# Patient Record
Sex: MALE | Race: WHITE | NOT HISPANIC OR LATINO | Employment: OTHER | URBAN - METROPOLITAN AREA
[De-identification: names, ages, dates, MRNs, and addresses within clinical notes are randomized per-mention and may not be internally consistent; named-entity substitution may affect disease eponyms.]

---

## 2017-11-09 ENCOUNTER — HOSPITAL ENCOUNTER (INPATIENT)
Facility: HOSPITAL | Age: 82
LOS: 5 days | Discharge: HOME HEALTH | End: 2017-11-14
Attending: INTERNAL MEDICINE | Admitting: INTERNAL MEDICINE

## 2017-11-09 DIAGNOSIS — I61.0 NONTRAUMATIC SUBCORTICAL HEMORRHAGE OF LEFT CEREBRAL HEMISPHERE (HCC): ICD-10-CM

## 2017-11-09 DIAGNOSIS — Z74.09 IMPAIRED MOBILITY AND ADLS: ICD-10-CM

## 2017-11-09 DIAGNOSIS — Z74.09 IMPAIRED FUNCTIONAL MOBILITY, BALANCE, GAIT, AND ENDURANCE: Primary | ICD-10-CM

## 2017-11-09 DIAGNOSIS — Z78.9 IMPAIRED MOBILITY AND ADLS: ICD-10-CM

## 2017-11-09 DIAGNOSIS — R41.841 COGNITIVE COMMUNICATION DISORDER: ICD-10-CM

## 2017-11-09 PROBLEM — I61.9 ICH (INTRACEREBRAL HEMORRHAGE) (HCC): Status: ACTIVE | Noted: 2017-11-09

## 2017-11-09 PROBLEM — N40.0 BPH (BENIGN PROSTATIC HYPERPLASIA): Status: ACTIVE | Noted: 2017-11-09

## 2017-11-09 PROBLEM — I25.2 HISTORY OF MI (MYOCARDIAL INFARCTION): Status: ACTIVE | Noted: 2017-11-09

## 2017-11-09 PROBLEM — I10 HYPERTENSION: Status: ACTIVE | Noted: 2017-11-09

## 2017-11-09 LAB
GLUCOSE BLDC GLUCOMTR-MCNC: 120 MG/DL (ref 70–130)
GLUCOSE BLDC GLUCOMTR-MCNC: 123 MG/DL (ref 70–130)

## 2017-11-09 PROCEDURE — 99291 CRITICAL CARE FIRST HOUR: CPT | Performed by: INTERNAL MEDICINE

## 2017-11-09 PROCEDURE — 82962 GLUCOSE BLOOD TEST: CPT

## 2017-11-09 PROCEDURE — 99222 1ST HOSP IP/OBS MODERATE 55: CPT | Performed by: NURSE PRACTITIONER

## 2017-11-09 RX ORDER — PHENYLEPHRINE HCL IN 0.9% NACL 0.5 MG/5ML
.5-3 SYRINGE (ML) INTRAVENOUS
Status: DISCONTINUED | OUTPATIENT
Start: 2017-11-09 | End: 2017-11-11

## 2017-11-09 RX ORDER — ANTIOX #8/OM3/DHA/EPA/LUT/ZEAX 250-2.5 MG
1 CAPSULE ORAL 2 TIMES DAILY
COMMUNITY

## 2017-11-09 RX ORDER — TAMSULOSIN HYDROCHLORIDE 0.4 MG/1
1 CAPSULE ORAL 2 TIMES DAILY
COMMUNITY

## 2017-11-09 RX ORDER — NAPROXEN SODIUM 220 MG
220 TABLET ORAL 2 TIMES DAILY
COMMUNITY
End: 2017-11-14 | Stop reason: HOSPADM

## 2017-11-09 RX ORDER — SODIUM CHLORIDE 0.9 % (FLUSH) 0.9 %
1-10 SYRINGE (ML) INJECTION AS NEEDED
Status: DISCONTINUED | OUTPATIENT
Start: 2017-11-09 | End: 2017-11-10

## 2017-11-09 RX ORDER — ATENOLOL 25 MG/1
25 TABLET ORAL DAILY
COMMUNITY
End: 2017-12-13

## 2017-11-09 RX ORDER — ALLOPURINOL 300 MG/1
300 TABLET ORAL DAILY
COMMUNITY

## 2017-11-09 NOTE — NURSING NOTE
"Stroke Navigator CODE 19    HPI    Vel James is a 83 y.o.  male who was in his normal state of health until approximately 2 am.  At this time he states that he got out of bed to use the restroom and noticed that he had incoordination of all limbs and that his balance was off.  He also noted bilateral lower extremity weakness and sustained a fall.  He denies hitting his head.  After laying back down and wakening around 0800, Mr. James states that he \"just didn't feel right\".  He proceeded to go about his morning, including going to the gas station to get gas for his .  At this time he noticed that his vision was worse than usual (he has macular degeneration) and had pain over the left eye.  This prompted him to seek evaluation at [facility].  A CT head was completed there which revealed a left temporal parenchymal hemorrhage.  He was transferred to our facility for further evaluation and treatment.    Neurological Assessment    The patient is alert, oriented, and follows all commands.  Pupils are round and equal.  Right visual field cut is present (patient states this is his baseline).  No blurry or double vision is present.  Slight facial asymmetry is present.  Speech is clear, mild expressive aphasia/loss of fluency is noted during conversation.  He moves all extremities bilaterally.  LLE weakness is present.  Sensation is intact.  Patient complains of a mild headache.      NIHSS    Interval: baseline  1a. Level Of Consciousness: 0-->Alert: keenly responsive  1b. LOC Questions: 0-->Answers both questions correctly  1c. LOC Commands: 0-->Performs both tasks correctly  2. Best Gaze: 0-->Normal  3. Visual: 1-->Partial hemianopia (baseline - patient has macular degeneration)  4. Facial Palsy: 1-->Minor paralysis (flattened nasolabial fold, asymmetry on smiling)  5a. Motor Arm, Left: 0-->No drift: limb holds 90 (or 45) degrees for full 10 secs  5b. Motor Arm, Right: 0-->No drift: limb holds 90 (or " 45) degrees for full 10 secs  6a. Motor Leg, Left: 1-->Drift: leg falls by the end of the 5-sec period but does not hit bed  6b. Motor Leg, Right: 0-->No drift: leg holds 30 degree position for full 5 secs  7. Limb Ataxia: 0-->Absent  8. Sensory: 0-->Normal: no sensory loss  9. Best Language: 1-->Mild-to-moderate aphasia: some obvious loss of fluency or facility of comprehension, without significant limitation on ideas expressed or form of expression. Reduction of speech and/or comprehension, however, makes conversation. . . (see row details)  10. Dysarthria: 0-->Normal  11. Extinction and Inattention (formerly Neglect): 0-->No abnormality    Total (NIH Stroke Scale): 4    PLAN    CT scan was reviewed with Dr. Regalado.  ICH score is 1.  Hemorrhagic stroke order set was initiated per protocol. The patient is not a candidate for surgical intervention at this time.  He will need to be closely monitored in the neurological ICU overnight with strict BP control (SBP <140) and a follow-up head CT in the morning.  Formal neurosurgical consult to follow.  Plan of care discussed with the patient and primary RN.     Lynn Sweeney, RN EXTENDER/STROKE NAVIGATOR

## 2017-11-09 NOTE — H&P
Intensive Care Admission Note     ICH (intracerebral hemorrhage)    History of Present Illness     This is a very nice 83-year-old gentleman with a past medical history significant for benign prostatic hypertrophy, hypertension treated with atenolol, and a previous distant myocardial infarction treated with balloon angioplasty who was in his usual state of health today when he was retrieving some gasoline for his lawnmower when he developed some incoordination, headache, and visual field cuts. He was brought to [facility] where upon he was found to have a small left occipital lobe intracerebral hemorrhage. Blood pressure results of the been able to review showed that he is had a maximum of approximately 200 mmHg systolic. We are consulted for transfer to our facility for further stroke care. He arrives on a Cardene drip and does complain of visual field cuts. He also has severe macular degeneration but states these cuts are different than his previous vision problems. He has stated that over the past several years he has noted some visual changes that were intermittent that he was concerned could possibly be transient ischemic attacks. He otherwise has not had any other complaints of neurologic changes either in the past or currently. He denies any problems with recent fevers, chills, night sweats. He does not have any chest pain, shortness of breath, or palpitations. He has not had any recent medication changes. He is not on aspirin or Plavix.    Problem List, Surgical History, Family, Social History, and ROS     Past Medical History:   Diagnosis Date   • Asthma, extrinsic    • BPH (benign prostatic hyperplasia)    • Coronary artery disease    • Hypertension    • Macular degeneration syndrome        Past Surgical History:   Procedure Laterality Date   • CARDIAC CATHETERIZATION       Medications have been reviewed. The patient takes tamsulosin and atenolol. He does not take any blood thinners or  "anticoagulants.    Family History   Problem Relation Age of Onset   • Stroke Maternal Grandfather      Social History   Substance Use Topics   • Smoking status: Never Smoker   • Smokeless tobacco: Not on file   • Alcohol use No       Review of Systems  A full review of systems was completed. They are negative such as mentioned especially in the HPI.    Physical Exam and Clinical Information   /61  Pulse 81  Ht 67\" (170.2 cm)  Wt 224 lb 13.9 oz (102 kg)  SpO2 97%  BMI 35.22 kg/m2  Physical Exam   Constitutional: He is oriented to person, place, and time. He appears well-developed and well-nourished. No distress.   HENT:   Head: Normocephalic and atraumatic.   Mouth/Throat: Oropharynx is clear and moist. No oropharyngeal exudate.   Eyes: Conjunctivae and EOM are normal. Pupils are equal, round, and reactive to light. Right eye exhibits no discharge. Left eye exhibits no discharge. No scleral icterus.   Neck: Normal range of motion. Neck supple. No JVD present. No tracheal deviation present. No thyromegaly present.   No carotid bruits   Cardiovascular: Normal rate, regular rhythm and normal heart sounds.  Exam reveals no gallop and no friction rub.    No murmur heard.  Pulmonary/Chest: Effort normal and breath sounds normal. No stridor. No respiratory distress. He has no wheezes. He has no rales. He exhibits no tenderness.   Abdominal: Soft. Bowel sounds are normal. He exhibits no distension. There is no tenderness. There is no rebound and no guarding.   Musculoskeletal: Normal range of motion. He exhibits no edema, tenderness or deformity.   Lymphadenopathy:     He has no cervical adenopathy.   Neurological: He is alert and oriented to person, place, and time.   Skin: Skin is warm. He is not diaphoretic. No erythema.   Psychiatric: He has a normal mood and affect. His behavior is normal. Thought content normal.         Outside hospital labs have been reviewed and show a normal metabolic panel as well as " CBC. Coag factors are normal.    Images: Outside hospital films have been reviewed. These show partially a 3 cm left occipital hemorrhagic stroke.    I reviewed the patient's results and images.     Impression     Hospital Problem List     * (Principal)ICH (intracerebral hemorrhage), left occipital    Hypertension    History of MI (myocardial infarction)    BPH (benign prostatic hyperplasia)        Plan/Recommendations     We will admit the patient to the intensive care unit for close neurological monitoring per our protocols.  The hemorrhagic stroke order set has been ordered including tight blood pressure control for systolic blood pressure less than 140 mmHg systolic.  Hemoglobin A1c as well as follow-up labs a been ordered for both today as well as tomorrow morning.  When he is more stable, we will restart some of his home medications which amount to atenolol and Flomax.  The patient is critical but currently stable secondary to his hemorrhagic stroke.      Rafiq Queen MD, [facility]  Pulmonary and Critical Care Medicine  11/09/17 2:38 PM     Critical Care Time: 42 min (Not including time performing procedures)    CC: Kishan Ceballos MD

## 2017-11-09 NOTE — CONSULTS
"NAME: CARLYLE LERMA  : 1934  PCP: Kishan Ceballos MD  Attending MD: Yunier García, *    Date of Admission:  2017  Date of Service: 2017    History of Present Illness:  83 y.o.  male who states he got up at 0200 this morning to go to the bathroom and he had dis coordination.  He also fell at that time but denies any loss of consciousness or hitting his head.  He laid back down and then at 0800 he got back up and \"just didn't feel right\"  He then went to the gas station to get gas for the .  He noticed that his vision was worse and hazy in his right eye and he felt disoriented.  He has macular degeneration in the left and loss of vision.  He made it home and his wife took him to [facility] where a CT of the head revealed a left temporal parenchymal hemorrhage.  He was transferred to our facility for a higher level of care.     ROS: a 14 point ROS was reviewed and all was negative except for the positives noted below:  Review of Systems - General ROS: positive for  - dizziness, confusion, falls  Ophthalmic ROS: positive for - blurry vision, decreased vision, loss of vision and photophobia  Neurological ROS: positive for - confusion, dizziness, gait disturbance, headaches, impaired coordination/balance and visual changes    Past Medical History:  Past Medical History:   Diagnosis Date   • Asthma, extrinsic    • BPH (benign prostatic hyperplasia)    • Coronary artery disease    • Hypertension    • Macular degeneration syndrome        Past Surgical History:  Past Surgical History:   Procedure Laterality Date   • CARDIAC CATHETERIZATION           Medications  Prescriptions Prior to Admission   Medication Sig Dispense Refill Last Dose   • allopurinol (ZYLOPRIM) 300 MG tablet Take 300 mg by mouth Daily.      • atenolol (TENORMIN) 25 MG tablet Take 25 mg by mouth Daily.      • tamsulosin (FLOMAX) 0.4 MG capsule 24 hr capsule Take 1 capsule by mouth 2 (Two) Times a Day.    "       Allergies:  No Known Allergies    Social Hx:  Social History     Social History   • Marital status:      Spouse name: N/A   • Number of children: N/A   • Years of education: N/A     Occupational History   • Not on file.     Social History Main Topics   • Smoking status: Never Smoker   • Smokeless tobacco: Not on file   • Alcohol use No   • Drug use: No   • Sexual activity: Not on file     Other Topics Concern   • Not on file     Social History Narrative   • No narrative on file       Family Hx:  Family History   Problem Relation Age of Onset   • Stroke Maternal Grandfather        Review of Imaging:  CT scan was reviewed from [facility] which revealed a small left temporal lobe intraparenchymal hemorrhage.     I have reviewed the images myself.       Laboratory Result:  No results found for: WBC, HGB, HCT, MCV, PLT  No results found for: GLUCOSE, CALCIUM, NA, K, CO2, CL, BUN, CREATININE, EGFRIFAFRI, EGFRIFNONA, BCR, ANIONGAP  No results found for: HGBA1C  No results found for: CHOL  No results found for: TRIG  No results found for: HDL  No results found for: LDLCALC  No results found for: LDL  No results found for: HDLLDLRATIO  No components found for: CHOLHDL    Physical Examination:  Vitals:    11/09/17 1515   BP: 134/77   Pulse: 81   SpO2: 95%        General Appearance:   Well developed, well nourished, well groomed, alert, and cooperative.  Cardiovascular: Regular rate and rhythm. No carotid bruits    Neurological examination: Alert and oriented, no facial droop noted, no drift on his upper or lower extremities.  He has no sensation loss.  He is fluent of speech at the present time.  He does have visual field loss on the left from macular degeneration, however on the right this is new from the [facility].        Diagnoses/Plan:    Mr. James is a 83 y.o. male who will need to be monitored in the ICU for the next 24-48 hours.  He will need blood pressure control of less than 140.  He is not currently on any  antiplatelet therapy.  We will continue to observe and obtain a repeat head CT tomorrow to evaluate.  I have discussed the plan with the family and patient and they verbalize understanding.

## 2017-11-09 NOTE — NURSING NOTE
"  ACC REVIEW REPORT: [facility]        PATIENT NAME: Vel James    PATIENT ID: [id number]    BED: N232    BED TYPE: ICU    BED GIVEN TO:  CASPER SHIN RN     TIME BED GIVEN: 1252    YOB: 1934    AGE: 83 YRS    GENDER: MALE    PREVIOUS ADMIT TO [facility]:     PREVIOUS ADMISSION DATE:     PATIENT CLASS:     TODAY'S DATE: 11/9/2017    TRANSFER DATE:   11/09/17    ETA:     TRANSFERRING FACILITY: [facility]    TRANSFERRING FACILITY PHONE # : [phone]    TRANSFERRING MD:     DATE/TIME REQUEST RECEIVED:  11/09/17 @ 1224    [facility] RN:  TAMARA CENTENO     REPORT FROM: CASPER SHIN RN     TIME REPORT TAKEN: 1252    DIAGNOSIS: LEFT OCCIPITAL BLEED, MEASURES 15 X 30 MM    REASON FOR TRANSFER TO [facility]: HIGHER LEVEL CARE    TRANSPORTATION: GROUND    CLINICAL REASON FOR TRANSFER TO [facility]:  HE WOKE UP AT 2 AM TO GO TO THE .  HE FELT HIS LEFT LEG TRYING TO \"GO OUT UNDER HIM.\"  HE WENT BACK TO BED AND AWAKENED THIS AM WITH BLINDNESS IN HIS RIGHT EYE.  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  HIS ONLY CURRENT DEFICIT IS THE VISION IN HIS RIGHT EYE.  HE IS COMPLETELY ALERT AND ORIENTED.  HE SAID HE JUST WENT TO HIS DOCTOR YESTERDAY.  CT OF HIS HEAD SHOWS A LEFT OCCIPITAL BLEED THAT MEASURES 15 X 30 MM.  HE HAS RECEIVED LABETALOL IV FOR HIS INITIAL B/P /85 AND IS ON A CARDIZEM GTT @ 5 MG.  B/P IS /78, 72, 16, 100% ON RMA.         CLINICAL INFORMATION    HEIGHT:     WEIGHT: 227 LBS    ALLERGIES: NKDA    RITCHIE: NO    INFECTIOUS DISEASE:     ISOLATION:     LAST VITAL SIGNS:   TIME: 1245  TEMP: AFEBRILE  PULSE: 72  B/P: 169/83  RESP: 16      LAB INFORMATION: BUN-22, CL-109   REST OF HIS BMP IS WNL AND THAT IS ALL THEY HAVE BACK.      CULTURE INFORMATION:     MEDS/IV FLUIDS:  (2) IV'S   # 18 G LFA WITH CARDIZEM GOING AT 5 MG AND HE HAS A # 18 G IN HIS RAC.  HE HAS RECEIVED LABETALOL FOR HIS INITIAL B/P /85.         CARDIAC SYSTEM:    CHEST PAIN:     RATE:     SCALE: "     RHYTHM:     Is patient taking or has patient been given any drugs that could increase bleeding? NO  (Plavix, Brilinta, Effient, Eliquis, Xarelto, Warfarin, Integrilin, Angiomax)    DRUG:      DOSE/FREQUENCY:     CARDIAC ENZYMES:      CARDIAC NOTES:       RESPIRATORY SYSTEM:    LUNG SOUNDS:    CLEAR: YES  CRACKLES:   WHEEZES:   RHONCHI:   DIMINISHED:     ABG DATE:         ABG TIME:     ABG RESULTS:    PH:   PO2:   PCO2:   HCO3:   O2 SAT:       OXYGEN:     O2 SAT:  100% ON RMA    ADMINISTRATION ROUTE:     IMAGING FINDINGS:     PNEUMO LOCATION:     PNEUMO SIZE:     PNEUMO CHEST TUBE SEAL TYPE:     RADIOLOGY RESULTS:     RESPIRATORY STATUS:       CNS/MUSCULOSKELETAL    ALERT AND ORIENTED:    PERSON: YES  PLACE: YES  TIME: YES    INJURY:  WHERE: NO    JCARLOS COMA SCALE:    :STROKE SCALE:  THEY WEREN'T ABLE TO DO A STROKE SCALE BECAUSE HE IS BLIND IN THE LEFT EYE.  (FROM MACULAR DEGENERATION)  HE AWAKENED THIS AM WITH BLINDNESS IN THE RIGHT EYE AND THAT IS A NEW SYMPTOM.     SIZE OF HEMORRHAGE:  15 X 3 MM    SYMPTOMS: (CHOOSE APPROPRIATE)    ASPHASIA:   ATAXIA:   DYSARTHRIA:   DYSPHASIA:   HEADACHE:   PARALYSIS:   SEIZURE:   SYNCOPE:   VERTIGO:   VISION CHANGE:        EXTREMITY WEAKNESS:    LEFT ARM:   RIGHT ARM:   LEFT LEG:   RIGHT LEG:     CAT SCAN RESULTS:     MRI RESULTS:     CNS/MUSCULOSKELETAL NOTES:       GI//GY WNL      ABDOMINAL PAIN:     VOMITING:     DIARRHEA:     NAUSEA:     BOWEL SOUNDS:     OCCULT STOOL:     VAGINAL BLEEDING:     TESTICULAR PAIN:     HEMATURIA:     NG TUBE:    SIZE:   DATE INSERTED:       ULTRASOUND:     ULTRASOUND RESULTS:       ACUTE ABDOMEN:     ACUTE ABDOMEN RESULTS:       CT SCAN:     CT SCAN RESULTS:       GI//GY NOTES:     PAST MEDICAL HISTORY:  HTN., ARTHRITIS, LEFT EYE BLINDNESS FROM MACULAR DEGENERATION.    OTHER SYMPTOM NOTES:     ADDITIONAL NOTES:           Chayo Leyva RN  11/9/2017  12:43 PM

## 2017-11-10 ENCOUNTER — APPOINTMENT (OUTPATIENT)
Dept: CT IMAGING | Facility: HOSPITAL | Age: 82
End: 2017-11-10

## 2017-11-10 ENCOUNTER — APPOINTMENT (OUTPATIENT)
Dept: CT IMAGING | Facility: HOSPITAL | Age: 82
End: 2017-11-10
Attending: NEUROLOGICAL SURGERY

## 2017-11-10 LAB
ANION GAP SERPL CALCULATED.3IONS-SCNC: 3 MMOL/L (ref 3–11)
ARTICHOKE IGE QN: 44 MG/DL (ref 0–130)
BASOPHILS # BLD AUTO: 0.02 10*3/MM3 (ref 0–0.2)
BASOPHILS NFR BLD AUTO: 0.2 % (ref 0–1)
BUN BLD-MCNC: 18 MG/DL (ref 9–23)
BUN/CREAT SERPL: 20 (ref 7–25)
CA-I SERPL ISE-MCNC: 1.31 MMOL/L (ref 1.12–1.32)
CALCIUM SPEC-SCNC: 8.9 MG/DL (ref 8.7–10.4)
CHLORIDE SERPL-SCNC: 109 MMOL/L (ref 99–109)
CHOLEST SERPL-MCNC: 80 MG/DL (ref 0–200)
CO2 SERPL-SCNC: 27 MMOL/L (ref 20–31)
CREAT BLD-MCNC: 0.9 MG/DL (ref 0.6–1.3)
DEPRECATED RDW RBC AUTO: 79.8 FL (ref 37–54)
EOSINOPHIL # BLD AUTO: 0.12 10*3/MM3 (ref 0–0.3)
EOSINOPHIL NFR BLD AUTO: 1.4 % (ref 0–3)
ERYTHROCYTE [DISTWIDTH] IN BLOOD BY AUTOMATED COUNT: 22.8 % (ref 11.3–14.5)
GFR SERPL CREATININE-BSD FRML MDRD: 81 ML/MIN/1.73
GLUCOSE BLD-MCNC: 86 MG/DL (ref 70–100)
HBA1C MFR BLD: 5.4 % (ref 4.8–5.6)
HCT VFR BLD AUTO: 35.9 % (ref 38.9–50.9)
HDLC SERPL-MCNC: 24 MG/DL (ref 40–60)
HGB BLD-MCNC: 12.1 G/DL (ref 13.1–17.5)
IMM GRANULOCYTES # BLD: 0.01 10*3/MM3 (ref 0–0.03)
IMM GRANULOCYTES NFR BLD: 0.1 % (ref 0–0.6)
LYMPHOCYTES # BLD AUTO: 1.69 10*3/MM3 (ref 0.6–4.8)
LYMPHOCYTES NFR BLD AUTO: 20.1 % (ref 24–44)
MCH RBC QN AUTO: 32.8 PG (ref 27–31)
MCHC RBC AUTO-ENTMCNC: 33.7 G/DL (ref 32–36)
MCV RBC AUTO: 97.3 FL (ref 80–99)
MONOCYTES # BLD AUTO: 1.28 10*3/MM3 (ref 0–1)
MONOCYTES NFR BLD AUTO: 15.2 % (ref 0–12)
NEUTROPHILS # BLD AUTO: 5.3 10*3/MM3 (ref 1.5–8.3)
NEUTROPHILS NFR BLD AUTO: 63 % (ref 41–71)
PLATELET # BLD AUTO: 204 10*3/MM3 (ref 150–450)
PMV BLD AUTO: 10.1 FL (ref 6–12)
POTASSIUM BLD-SCNC: 4 MMOL/L (ref 3.5–5.5)
RBC # BLD AUTO: 3.69 10*6/MM3 (ref 4.2–5.76)
SODIUM BLD-SCNC: 139 MMOL/L (ref 132–146)
TRIGL SERPL-MCNC: 88 MG/DL (ref 0–150)
WBC NRBC COR # BLD: 8.42 10*3/MM3 (ref 3.5–10.8)

## 2017-11-10 PROCEDURE — 99233 SBSQ HOSP IP/OBS HIGH 50: CPT | Performed by: INTERNAL MEDICINE

## 2017-11-10 PROCEDURE — 92523 SPEECH SOUND LANG COMPREHEN: CPT

## 2017-11-10 PROCEDURE — 83036 HEMOGLOBIN GLYCOSYLATED A1C: CPT | Performed by: NEUROLOGICAL SURGERY

## 2017-11-10 PROCEDURE — 97163 PT EVAL HIGH COMPLEX 45 MIN: CPT

## 2017-11-10 PROCEDURE — 82330 ASSAY OF CALCIUM: CPT | Performed by: INTERNAL MEDICINE

## 2017-11-10 PROCEDURE — 70450 CT HEAD/BRAIN W/O DYE: CPT

## 2017-11-10 PROCEDURE — 25010000002 ONDANSETRON PER 1 MG: Performed by: NURSE PRACTITIONER

## 2017-11-10 PROCEDURE — 97165 OT EVAL LOW COMPLEX 30 MIN: CPT

## 2017-11-10 PROCEDURE — 80048 BASIC METABOLIC PNL TOTAL CA: CPT | Performed by: INTERNAL MEDICINE

## 2017-11-10 PROCEDURE — 80061 LIPID PANEL: CPT | Performed by: NEUROLOGICAL SURGERY

## 2017-11-10 PROCEDURE — 85025 COMPLETE CBC W/AUTO DIFF WBC: CPT | Performed by: INTERNAL MEDICINE

## 2017-11-10 PROCEDURE — 99231 SBSQ HOSP IP/OBS SF/LOW 25: CPT | Performed by: NEUROLOGICAL SURGERY

## 2017-11-10 RX ORDER — MELATONIN
1000 DAILY
COMMUNITY

## 2017-11-10 RX ORDER — CEPHRADINE 500 MG
1 CAPSULE ORAL DAILY
COMMUNITY

## 2017-11-10 RX ORDER — ONDANSETRON 2 MG/ML
4 INJECTION INTRAMUSCULAR; INTRAVENOUS EVERY 6 HOURS PRN
Status: DISCONTINUED | OUTPATIENT
Start: 2017-11-10 | End: 2017-11-14 | Stop reason: HOSPADM

## 2017-11-10 RX ORDER — FAMOTIDINE 20 MG/1
20 TABLET, FILM COATED ORAL 2 TIMES DAILY
COMMUNITY
End: 2018-02-12

## 2017-11-10 RX ORDER — ACETAMINOPHEN 325 MG/1
650 TABLET ORAL EVERY 6 HOURS PRN
Status: DISCONTINUED | OUTPATIENT
Start: 2017-11-10 | End: 2017-11-14 | Stop reason: HOSPADM

## 2017-11-10 RX ORDER — SILDENAFIL CITRATE 20 MG/1
20 TABLET ORAL DAILY
COMMUNITY
End: 2018-02-12

## 2017-11-10 RX ORDER — ATENOLOL 25 MG/1
25 TABLET ORAL DAILY
Status: DISCONTINUED | OUTPATIENT
Start: 2017-11-10 | End: 2017-11-14 | Stop reason: HOSPADM

## 2017-11-10 RX ORDER — LANOLIN ALCOHOL/MO/W.PET/CERES
2000 CREAM (GRAM) TOPICAL DAILY
COMMUNITY

## 2017-11-10 RX ADMIN — ACETAMINOPHEN 650 MG: 325 TABLET ORAL at 17:23

## 2017-11-10 RX ADMIN — ONDANSETRON 4 MG: 2 INJECTION INTRAMUSCULAR; INTRAVENOUS at 23:19

## 2017-11-10 RX ADMIN — NICARDIPINE HYDROCHLORIDE 5 MG/HR: 2.5 INJECTION INTRAVENOUS at 17:20

## 2017-11-10 RX ADMIN — NICARDIPINE HYDROCHLORIDE 15 MG/HR: 2.5 INJECTION INTRAVENOUS at 15:35

## 2017-11-10 RX ADMIN — ATENOLOL 25 MG: 25 TABLET ORAL at 14:34

## 2017-11-10 RX ADMIN — NICARDIPINE HYDROCHLORIDE 5 MG/HR: 2.5 INJECTION INTRAVENOUS at 12:30

## 2017-11-10 RX ADMIN — ONDANSETRON 4 MG: 2 INJECTION INTRAMUSCULAR; INTRAVENOUS at 14:35

## 2017-11-10 NOTE — PROGRESS NOTES
"Adult Nutrition  Assessment/PES    Patient Name:  Vel James  YOB: 1934  MRN: [id number]  Admit Date:  11/9/2017    Assessment Date:  11/10/2017              Reason for Assessment       11/10/17 1756    Reason for Assessment    Reason For Assessment/Visit identified at risk by screening criteria    Time Spent (min) 30    Diagnosis Diagnosis    Cardiac CAD;MI;HTN    Neurological ICH    Pulmonary/Critical Care Asthma    Other diagnosis Altered vision,h/o magular degeneration        Patient Active Problem List   Diagnosis   • ICH (intracerebral hemorrhage), left occipital   • Hypertension   • History of MI (myocardial infarction)   • BPH (benign prostatic hyperplasia)             Anthropometrics       11/10/17 1759    Anthropometrics (Special Considerations)    Height Used for Calculations 1.702 m (5' 7\")    Weight Used for Calculations 102 kg (224 lb)    RD Calculated BMI (kg/m2) 35            Labs/Tests/Procedures/Meds       11/10/17 1800    Labs/Tests/Procedures/Meds    Labs/Tests Review Reviewed;BUN;K+;Creat       Results from last 7 days  Lab Units 11/10/17  0413   SODIUM mmol/L 139   POTASSIUM mmol/L 4.0   CHLORIDE mmol/L 109   CO2 mmol/L 27.0   BUN mg/dL 18   CREATININE mg/dL 0.90   CALCIUM mg/dL 8.9   GLUCOSE mg/dL 86              Physical Findings       11/10/17 1800    Physical Findings/Assessment    Additional Documentation --   pt seems agitated, c/o difficulty seeing, is attempting to eat dinner, does not want anything sweet    per RN: pt has been complaining of headache, vomited 2x earlier today, very anxious, blurry vision, impulsive              Nutrition Prescription Ordered       11/10/17 1802    Nutrition Prescription PO    Current PO Diet Regular    Common Modifiers Cardiac            Evaluation of Received Nutrient/Fluid Intake       11/10/17 1802    PO Evaluation    Number of Days PO Intake Evaluated Insufficient Data    Number of Meals 2    % PO Intake 38    "         Problem/Interventions:        Problem 1       11/10/17 1756    Nutrition Diagnoses Problem 1    Problem 1 No Nutrition Diagnosis at this Time                    Intervention Goal       11/10/17 1802    Intervention Goal    General Nutrition support treatment    PO Establish PO            Nutrition Intervention       11/10/17 1802    Nutrition Intervention    RD/Tech Action Interview for preference;Menu provided;Menu adjusted;Follow Tx progress;Care plan reviewd              Education/Evaluation       11/10/17 1802    Monitor/Evaluation    Monitor Per protocol;PO intake;Pertinent labs;Weight;Skin status;Symptoms        Electronically signed by:  Mahogany Ashley RD  11/10/17 6:02 PM

## 2017-11-10 NOTE — PROGRESS NOTES
"Intensive Care Follow-up     Hospital:  LOS: 1 day   Mr. Vel James, 83 y.o. male is followed for:   ICH (intracerebral hemorrhage)        Subjective   Interval History:  The charts been reviewed. The patient has done relatively well overnight. It is reported to me that he has had some impulsiveness as well as some mild aphasia from time to time. He continues to complain of a visual field cut.    The patient's relevant past medical, surgical and social history were reviewed and updated in Epic as appropriate.        Objective     Infusions:    niCARdipine 5-15 mg/hr Last Rate: Stopped (11/09/17 2036)   phenylephrine 0.5-3 mcg/kg/min      Medications:       Vital Sign Min/Max for last 24 hours  Temp  Min: 97.6 °F (36.4 °C)  Max: 97.8 °F (36.6 °C)   BP  Min: 92/82  Max: 135/68   Pulse  Min: 63  Max: 90   Resp  Min: 14  Max: 18   SpO2  Min: 92 %  Max: 99 %   No Data Recorded       Input/Output for last 24 hour shift  11/09 0701 - 11/10 0700  In: 590.1 [P.O.:120; I.V.:470.1]  Out: 1250 [Urine:1250]      Objective:  General Appearance:  Comfortable, well-appearing and in no acute distress.    Vital signs: (most recent): Blood pressure 125/80, pulse 71, temperature 97.8 °F (36.6 °C), temperature source Oral, resp. rate 16, height 67\" (170.2 cm), weight 224 lb 13.9 oz (102 kg), SpO2 94 %.  No fever.    Output: Producing urine.    HEENT: Normal HEENT exam.    Lungs:  Normal respiratory rate and normal effort.  He is not in respiratory distress.  Breath sounds clear to auscultation.  No stridor.  No wheezes, rales, rhonchi or decreased breath sounds.    Heart: Normal rate.  Regular rhythm.  S1 normal and S2 normal.  No murmur or friction rub.   Chest: Symmetric chest wall expansion.   Abdomen: Abdomen is soft and non-distended.  Bowel sounds are normal.   There is no abdominal tenderness.  There is no rebound tenderness.     Extremities: Normal range of motion.  There is no dependent edema.    Neurological: Patient is " alert and oriented to person, place and time.  Normal strength.    Pupils:  Pupils are equal, round, and reactive to light.  Pupils are equal.               Results from last 7 days  Lab Units 11/10/17  0413   WBC 10*3/mm3 8.42   HEMOGLOBIN g/dL 12.1*   PLATELETS 10*3/mm3 204       Results from last 7 days  Lab Units 11/10/17  0413   SODIUM mmol/L 139   POTASSIUM mmol/L 4.0   CO2 mmol/L 27.0   BUN mg/dL 18   CREATININE mg/dL 0.90   GLUCOSE mg/dL 86     Estimated Creatinine Clearance: 70.8 mL/min (by C-G formula based on Cr of 0.9).          I reviewed the patient's results and images.     Assessment/Plan   Impression      Principal Problem:    ICH (intracerebral hemorrhage), left occipital  Active Problems:    Hypertension    History of MI (myocardial infarction)    BPH (benign prostatic hyperplasia)       Plan        Continue with physical and occupational therapy.  Due to continued changes, I would prefer to keep him in the intensive care unit today.  Continue with close neurological checks.  Follow-up labs and orders have been placed.    Plan of care and goals reviewed with mulitdisciplinary team at daily rounds.   I discussed the patient's findings and my recommendations with patient and nursing staff         Rafiq Queen MD, [facility]  Pulmonary and Critical Care Medicine  11/10/17 11:10 AM

## 2017-11-10 NOTE — PROGRESS NOTES
Subjective: No events overnight    Objective:    Vitals:    11/10/17 0600   BP: 125/80   Pulse: 71   Resp:    Temp:    SpO2: 94%     Pulse  Av.7  Min: 63  Max: 90  Systolic (24hrs), Av , Min:92 , Max:135     Diastolic (24hrs), Av, Min:60, Max:100    Temp (24hrs), Av.7 °F (36.5 °C), Min:97.6 °F (36.4 °C), Max:97.8 °F (36.6 °C)      He is awake, conversant, and appropriate.  He is reporting that his homonomous hemianopsia is subjectively somewhat better today.    A repeat head CT was performed.  This demonstrates the expected evolution of the small, left temporal intraparenchymal hematoma without significant progression.    Lab Results   Component Value Date     11/10/2017       A/P:   Cleared to transfer out of the ICU from a neurosurgical perspective  No neurosurgical intervention planned at this point  Presumptive etiology for his hemorrhage is hypertensive versus amyloid  Follow-up in stroke clinic in 30 days upon discharge  Anticipate some home health/PT requirements for his new visual field defects.

## 2017-11-10 NOTE — PLAN OF CARE
Problem: Patient Care Overview (Adult)  Goal: Plan of Care Review  Outcome: Ongoing (interventions implemented as appropriate)    11/10/17 1202   Coping/Psychosocial Response Interventions   Plan Of Care Reviewed With patient   Patient Care Overview   Progress progress toward functional goals as expected   Outcome Evaluation   Outcome Summary/Follow up Plan pt. presents with unstable characteristics to include increase in headache, visual changes, and increase in blood pressure. PT warrented to improve safety and independence with mobility and progress towards ambulation as medically appropriate. During eval, patient demonstrated sudden onset of pain,vision and vital sign changes therefore further mobility deferred and RN notified.         Problem: Stroke (Hemorrhagic) (Adult)  Goal: Signs and Symptoms of Listed Potential Problems Will be Absent or Manageable (Stroke)  Outcome: Ongoing (interventions implemented as appropriate)    11/10/17 1202   Stroke (Hemorrhagic)   Problems Assessed (Stroke (Hemorrhagic)) motor/sensory impairment   Problems Present (Stroke (Hemorrhagic)) motor/sensory impairment         Problem: Inpatient Physical Therapy  Goal: Bed Mobility Goal LTG- PT  Outcome: Ongoing (interventions implemented as appropriate)    11/10/17 1202   Bed Mobility PT LTG   Bed Mobility PT LTG, Date Established 11/10/17   Bed Mobility PT LTG, Time to Achieve 2 wks   Bed Mobility PT LTG, Activity Type supine to sit/sit to supine   Bed Mobility PT LTG, [place] Level independent   Bed Mobility PT Goal LTG, Assist Device bed rails       Goal: Transfer Training Goal 1 LTG- PT  Outcome: Ongoing (interventions implemented as appropriate)    11/10/17 1202   Transfer Training PT LTG   Transfer Training PT LTG, Date Established 11/10/17   Transfer Training PT LTG, Time to Achieve 2 wks   Transfer Training PT LTG, Activity Type bed to chair /chair to bed   Transfer Training PT LTG, [place] Level supervision required    Transfer Training PT LTG, Assist Device walker, rolling       Goal: Gait Training Goal LTG- PT  Outcome: Ongoing (interventions implemented as appropriate)    11/10/17 1202   Gait Training PT LTG   Gait Training Goal PT LTG, Date Established 11/10/17   Gait Training Goal PT LTG, Time to Achieve 2 wks   Gait Training Goal PT LTG, [place] Level supervision required   Gait Training Goal PT LTG, Assist Device walker, rolling   Gait Training Goal PT LTG, Distance to Achieve 300'

## 2017-11-10 NOTE — THERAPY EVALUATION
Acute Care - Speech Language Pathology Initial Evaluation  [facility]   Cognitive-Communication Evaluation     Patient Name: Vel James  : 1934  MRN: [id number]  Today's Date: 11/10/2017  Onset of Illness/Injury or Date of Surgery Date: 17            Admit Date: 2017     Visit Dx:    ICD-10-CM ICD-9-CM   1. Impaired functional mobility, balance, gait, and endurance Z74.09 V49.89   2. Impaired mobility and ADLs Z74.09 799.89   3. 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 315.32     Patient Active Problem List   Diagnosis   • ICH (intracerebral hemorrhage), left occipital   • Hypertension   • History of MI (myocardial infarction)   • BPH (benign prostatic hyperplasia)     Past Medical History:   Diagnosis Date   • Asthma, extrinsic    • BPH (benign prostatic hyperplasia)    • Coronary artery disease    • Hypertension    • Macular degeneration syndrome      Past Surgical History:   Procedure Laterality Date   • CARDIAC CATHETERIZATION            SLP EVALUATION (last 72 hours)      SLP Evaluation       11/10/17 1400                Rehab Evaluation    Document Type evaluation  -AC        Subjective Information complains of;pain;fatigue  -AC        General Information    Patient Profile Review yes  -AC        Onset of Illness/Injury 17  -AC        Subjective Patient Observations Pt alert, c/o headache, fatigue, frustration. Pt's wife present.   -AC        Pertinent History Of Current Problem Pt adm w/ L ICH. PMH: HTN, asthma, macular degeneration. Passed RN CVA dysphagia screen.  -AC        Current Diet Limitations regular solid;thin liquids  -AC        Precautions/Limitations, Vision other (see comments)   hx macular degeneration per chart  -AC        Precautions/Limitations, Hearing WFL;other (see comments)   for purposes of eval  -AC        Prior Level of Function- Communication functional in all spheres  -AC        Prior Level of Function- Swallowing no diet consistency restrictions  -AC         Plans/Goals Discussed With patient;spouse/S.O.;agreed upon  -AC        Barriers to Rehab none identified  -AC        Living Environment    Lives With spouse  -AC        Living Environment Comment Pt former . Lives w/ wife.   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 Pt very frustrated by deficits. W/ frustration & headache, eval was limited. Pt was able to state basic wants/needs w/o difficulty. Provided edu to pt/his wife re: cognitive-linguistic deficits & compensatory strategies. Further dx tx needed when pt able to participate.   -AC        Functional Level At Time Of Evaluation impaired  -AC        Patient's Goals For Discharge patient did not state  -AC        Family Goals For Discharge family did not state  -AC        Criteria for Skilled Therapeutic Interventions Met skilled criteria for cognitive linguistic intervention met  -AC        Rehab Potential good, to achieve stated therapy goals  -AC        Therapy Frequency 5 times/wk  -AC        Pain Assessment    Pain Assessment 0-10  -AC        Pain Score 8  -AC        Post Pain Score 8  -AC        Pain Type Acute pain  -AC        Pain Location Head  -AC        Pain Intervention(s) Rest   RN present & aware  -AC        Response to Interventions tolerated  -AC        Cognitive Assessment/Intervention    Current Cognitive/Communication Assessment impaired  -AC        Orientation Status oriented to;person;place;disoriented to;time;situation  -AC        Short/Long Term Memory unable/difficult to assess  -AC        Additional Documentation Cognitive Assessment Intervention (Group)  -AC        Cognitive Assessment Intervention    Behavior/Mood Observations alert  -AC        Attention unable/difficult to assess  -AC        Problem Solving unable/difficult to assess  -AC        Reasoning unable/difficult to assess  -AC        Organization unable/difficult to assess   "-AC        Communication Assessment/Intervention    Additional Documentation Auditory Comprehen Assess/Intervention (Group);Motor Speech Assessment/Intervention (Group);Verbal Expression Assess/Intervention (Group)  -AC        Auditory Comprehen Assess/Intervention    Auditory Comprehension other (see comments)   further assessment needed when pt able to cooperate  -AC        Auditory Comprehen Assess/Intervention    Answers Yes/No Questions other (see comments)   general & personal y/n ?s: 70% acc  -AC        Able to Follow Commands other (see comments)   pt did not cooperate to follow commands: \"I dont' want to.\"  -AC        Verbal Expression Assess/Intervention    Automatic Speech successful, spontaneous greeting  -AC        Conversational Speech Comment Word-finding difficulty noted @ times in conversational speech. Pt/his wife reported pt has having difficulty recalling the names of his children. Pt did not cooperate to complete structured verbal expression tasks 2' headache/frustration. Further assessment needed.  -AC        Motor Speech Assess/Intervention    Motor Speech-Apraxia Observations no concerns  -AC        Motor Speech-Dysarthria Observations no concerns  -AC        Motor Speech- Dysarthria Comment Pt 100% intelligible to unfamiliar listener. No obvious motor speech deficits noted in conversational speech.  -AC        Communication Treatment Objective and Progress    Expressive Treatment Objectives Improve word retrieval skills  -AC        Improve word retrieval skills    Improve word retrieval skills by: completing functional word finding tasks;80%;with inconsistent cues  -AC        Status: Improve word retrieval skills New  -        Word Retrieval Skills Progress continue to address  -          User Key  (r) = Recorded By, (t) = Taken By, (c) = Cosigned By    Initials Name Effective Dates    AC Katt Serrano MS CCC-SLP 07/27/17 -            EDUCATION  The patient has been educated in the " following areas:   Cognitive Impairment Communication Impairment.    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 Pt very frustrated by deficits. W/ frustration & headache, eval was limited. Pt was able to state basic wants/needs w/o difficulty. Provided edu to pt/his wife re: cognitive-linguistic deficits & compensatory strategies. Further dx tx needed when pt able to participate.   Rehab Potential: good, to achieve stated therapy goals  Criteria for Skilled Therapeutic Interventions Met: skilled criteria for cognitive linguistic intervention met  Therapy Frequency: 5 times/wk    Plan of Care Review  Plan Of Care Reviewed With: patient, spouse  Progress:  (eval)  Outcome Summary/Follow up Plan: Cognitive-communication eval completed per CVA Pathway. 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 Pt very frustrated by deficits. W/ frustration & headache, eval was limited. Pt was able to state basic wants/needs w/o difficulty. Provided edu to pt/his wife re: cognitive-linguistic deficits & compensatory strategies. Further dx tx needed when pt able to participate. SLP will f/u.          IP SLP Goals       11/10/17 1541          Expressive- Optimal Participation in Care    Expressive Optimal Participation in Care- SLP, Date Established 11/10/17  -      Expressive Optimal Participation in Care- SLP, Time to Achieve by discharge  -        User Key  (r) = Recorded By, (t) = Taken By, (c) = Cosigned By    Initials Name Provider Type    TINY Serrano MS CCC-SLP Speech and Language Pathologist        Time Calculation:         Time Calculation- SLP       11/10/17 1542          Time Calculation- SLP    SLP Start Time 1400  -      SLP Received On 11/10/17  -        User Key  (r) = Recorded By, (t) = Taken By, (c) = Cosigned By    Initials Name Provider Type    TINY Serrano MS CCC-SLP Speech and  Language Pathologist          Therapy Charges for Today     Code Description Service Date Service Provider Modifiers Qty    [id number]  ST EVAL SPEECH AND PROD W LANG  2 11/10/2017 Katt Serrano, MS CCC-SLP GN 1                     Katt Serrano, MS EMILIO-SLP  11/10/2017

## 2017-11-10 NOTE — PLAN OF CARE
Problem: Patient Care Overview (Adult)  Goal: Plan of Care Review  Outcome: Ongoing (interventions implemented as appropriate)    11/10/17 1414   Coping/Psychosocial Response Interventions   Plan Of Care Reviewed With patient   Patient Care Overview   Progress progress toward functional goals is gradual   Outcome Evaluation   Outcome Summary/Follow up Plan OT completed a brief chart review relating to presenting physical deficits. Pt session limited d/t sudden onset HA, visual changes, and significant increase in BP. Recommend pt DC to IP rehab based on current level of performance, however will monitor progress closely. Recommend cont skilled IPOT intervention.          Problem: Inpatient Occupational Therapy  Goal: Transfer Training Goal 1 LTG- OT  Outcome: Ongoing (interventions implemented as appropriate)    11/10/17 1414   Transfer Training OT LTG   Transfer Training OT LTG, Date Established 11/10/17   Transfer Training OT LTG, Time to Achieve by discharge   Transfer Training OT LTG, Activity Type toilet;sit to stand/stand to sit   Transfer Training OT LTG, [place] Level contact guard assist   Transfer Training OT LTG, Additional Goal AAD       Goal: Patient Education Goal LTG- OT  Outcome: Ongoing (interventions implemented as appropriate)    11/10/17 1414   Patient Education OT LTG   Patient Education OT LTG, Date Established 11/10/17   Patient Education OT LTG, Time to Achieve by discharge   Patient Education OT LTG, Education Type written program;HEP;positioning;posture/body mechanics;home safety;adaptive equipment mgmt;energy conservation   Patient Education OT LTG, Education Understanding verbalizes understanding       Goal: LB Dressing Goal LTG- OT  Outcome: Ongoing (interventions implemented as appropriate)    11/10/17 1414   LB Dressing OT LTG   LB Dressing Goal OT LTG, Date Established 11/10/17   LB Dressing Goal OT LTG, Time to Achieve by discharge   LB Dressing Goal OT LTG, Activity Type  Don/doff socks   LB Dressing Goal OT LTG, [place] Level supervision required   LB Dressing Goal OT LTG, Additional Goal AAD       Goal: Activity Tolerance Goal LTG- OT  Outcome: Ongoing (interventions implemented as appropriate)    11/10/17 1414   Activity Tolerance OT LTG   Activity Tolerance Goal OT LTG, Date Established 11/10/17   Activity Tolerance Goal OT LTG, Time to Achieve by discharge   Activity Tolerance Goal OT LTG, Activity Level 10 min activity   Activity Tolerance Goal OT LTG, Additional Goal 1 seated rest break, VSS

## 2017-11-10 NOTE — THERAPY EVALUATION
Acute Care - Occupational Therapy Initial Evaluation  [facility]     Patient Name: Vel James  : 1934  MRN: [id number]  Today's Date: 11/10/2017  Onset of Illness/Injury or Date of Surgery Date: 17  Date of Referral to OT: 17  Referring Physician: MD Fabricio    Admit Date: 2017       ICD-10-CM ICD-9-CM   1. Impaired functional mobility, balance, gait, and endurance Z74.09 V49.89   2. Impaired mobility and ADLs Z74.09 799.89     Patient Active Problem List   Diagnosis   • ICH (intracerebral hemorrhage), left occipital   • Hypertension   • History of MI (myocardial infarction)   • BPH (benign prostatic hyperplasia)     Past Medical History:   Diagnosis Date   • Asthma, extrinsic    • BPH (benign prostatic hyperplasia)    • Coronary artery disease    • Hypertension    • Macular degeneration syndrome      Past Surgical History:   Procedure Laterality Date   • CARDIAC CATHETERIZATION            OT ASSESSMENT FLOWSHEET (last 72 hours)      OT Evaluation       11/10/17 1412 11/10/17 1406 11/10/17 1036 11/10/17 1020 17    Rehab Evaluation    Document Type   evaluation  -MJ evaluation  -CL     Subjective Information   agree to therapy;complains of;pain   headache  -MJ agree to therapy;complains of;pain  -CL     Patient Effort, Rehab Treatment   good  -MJ good  -CL     Patient Effort, Rehab Treatment Comment   limited by increased headache, dizziness and vision changes  -MJ      Symptoms Noted During/After Treatment   increased pain;significant change in vital signs  -MJ increased pain;significant change in vital signs  -CL     Symptoms Noted Comment   noted acute onset of increase in BP, increase in headache and vision changes.  RN notified immediately who was present remainder of session.    -MJ Prior to any activity, pt c/o sudden onset dizziness and increased HA, BP checked and noted significant change. RN notified immediately and was present for remainder of session. Pt w/ noted  increased expressive aphasia, disoriented to place, and decreased vision in R eye. RN and charge nurse present and aware. RN requested to have pt remain reclined in chair. Deferred further tx.  -CL     General Information    Patient Profile Review   yes  -MJ yes  -CL     Onset of Illness/Injury or Date of Surgery Date   11/09/17  -MJ 11/09/17  -CL     Referring Physician   MD Fabricio  -LIS Regalado MD  -CL     Pertinent History Of Current Problem   admitting w/ headache, worsening vision, incoordination. Per CT scan, L occipital lobe intracranial hemorrhage  - Pt prseented to [facility] ED 2/2 to developing a HA, incoordination, and visual field cuts. Pt found to have a small L occipital lobe ICH. Pt t/f to [facility] for HLOC.   -CL     Precautions/Limitations   fall precautions  - fall precautions;other (see comments)   B visual impairment, macular degeneration  -CL     Prior Level of Function   independent:;all household mobility;community mobility;ADL's  - independent:;all household mobility;transfer;ADL's;dressing;bathing;driving  -     Equipment Currently Used at Home cane, straight  -  cane, straight  - cane, straight  -CL     Plans/Goals Discussed With   patient  -MJ patient;agreed upon  -CL     Risks Reviewed   patient:;LOB;dizziness  - patient:;LOB;nausea/vomiting;dizziness;increased discomfort;change in vital signs;lines disloged  -CL     Benefits Reviewed   patient:;improve function;increase strength;increase balance;increase independence  - patient:;improve function;increase independence;decrease pain;increase balance;increase strength;increase knowledge  -     Barriers to Rehab   none identified  - none identified  -CL     Living Environment    Lives With spouse  -  spouse  -MJ spouse  -CL     Living Arrangements house   Lives in 1 level home in [facility] his wife.  2 steps to get into front door.  -  house  - house  -CL     Home Accessibility no concerns;bed and bath on same level;stairs  to enter home  -HH  no concerns;bed and bath on same level;stairs to enter home  -MJ stairs to enter home;bed and bath on same level   walk-in shower  -CL     Number of Stairs to Enter Home 2  -HH  1  -MJ 1  -CL     Stair Railings at Home none  -HH  none  -MJ      Type of Financial/Environmental Concern none  -HH        Transportation Available car;family or friend will provide  -HH        Clinical Impression    Date of Referral to OT    11/09/17  -CL     OT Diagnosis    Decreased independence in ADLs.   -CL     Patient/Family Goals Statement    Return to [facility].   -CL     Criteria for Skilled Therapeutic Interventions Met    yes;treatment indicated  -CL     Rehab Potential    good, to achieve stated therapy goals  -CL     Therapy Frequency    daily  -CL     Anticipated Equipment Needs At Discharge    --   TBA further  -CL     Anticipated Discharge Disposition    inpatient rehabilitation facility  -CL     Functional Level Prior    Ambulation  1-->assistive equipment  -HH   1-->assistive equipment  -AS    Transferring  0-->independent  -HH   0-->independent  -AS    Toileting  0-->independent  -HH   0-->independent  -AS    Bathing  0-->independent  -HH   0-->independent  -AS    Dressing  0-->independent  -HH   0-->independent  -AS    Eating  0-->independent  -HH   0-->independent  -AS    Communication  0-->understands/communicates without difficulty  -HH   0-->understands/communicates without difficulty  -AS    Swallowing  0-->swallows foods/liquids without difficulty  -HH   0-->swallows foods/liquids without difficulty  -AS    Prior Functional Level Comment     independent  -AS    Vital Signs    Pre Systolic BP Rehab   129  -  -CL     Pre Treatment Diastolic BP   75  -MJ 75  -CL     Intra Systolic BP Rehab   151  -  -CL     Intra Treatment Diastolic BP   108  -  -CL     Post Systolic BP Rehab   156  -  -CL     Post Treatment Diastolic BP   98  -MJ 98  -CL     Pretreatment Heart Rate (beats/min)     77  -CL     Posttreatment Heart Rate (beats/min)    72  -CL     Pre SpO2 (%)   98  -MJ 98  -CL     O2 Delivery Pre Treatment   room air  -MJ room air  -CL     Post SpO2 (%)   98  -MJ 98  -CL     O2 Delivery Post Treatment   room air  -MJ room air  -CL     Pain Assessment    Pain Assessment   0-10  -MJ 0-10  -CL     Pain Score   4  -MJ 4  -CL     Post Pain Score   8  -MJ 8  -CL     Pain Type   Acute pain  -MJ Acute pain  -CL     Pain Location   Head  -MJ Head  -CL     Pain Orientation   Anterior   L forehead  -MJ Anterior  -CL     Pain Descriptors   Headache  -MJ      Pain Onset   Sudden   change while seated in chair prior to mobility  -MJ      Pain Intervention(s)   Repositioned  -MJ Repositioned  -CL     Response to Interventions   RN notified immediately  -MJ RN notified immediately.   -CL     Vision Assessment/Intervention    Visual Impairment Comment   pt. reports change in vision from start to end of evaluation.  RN notified and assessed  -MJ Pt w/ L sided visual deficits at baseline. Pt reported change in R sided vision during session, RN aware.   -CL     Visual Tracking Comment    Pt tracks appropriately R-L, though decreased to the L (pt reports is baseline).  -CL     Cognitive Assessment/Intervention    Current Cognitive/Communication Assessment   functional   progressively became confused as evaluation progressed  -MJ functional  -CL     Orientation Status   oriented x 4  -MJ oriented x 4   Pt intially oriented x4, then progressed to only person  -CL     Follows Commands/Answers Questions   able to follow single-step instructions;75% of the time  -MJ 75% of the time;needs cueing;needs increased time;needs repetition  -CL     Personal Safety   decreased awareness, need for assist;decreased awareness, need for safety  -MJ moderate impairment;decreased awareness, need for assist;decreased awareness, need for safety;impulsive  -CL     Personal Safety Interventions   fall prevention program maintained;gait  belt;nonskid shoes/slippers when out of bed  -MJ fall prevention program maintained;gait belt;nonskid shoes/slippers when out of bed  -CL     ROM (Range of Motion)    General ROM   no range of motion deficits identified  -MJ no range of motion deficits identified  -CL     MMT (Manual Muscle Testing)    General MMT Assessment   no strength deficits identified  -MJ      General MMT Assessment Detail   4/5 throughout BLE  -MJ BUE grossly 4-/5.   -CL     Bed Mobility, Assessment/Treatment    Bed Mobility, Comment    UIC.   -CL     Transfer Assessment/Treatment    Transfers, Sit-Stand [place]   contact guard assist  -MJ contact guard assist;verbal cues required  -CL     Transfers, Stand-Sit [place]   minimum assist (75% patient effort)  -MJ minimum assist (75% patient effort);verbal cues required  -CL     Transfer, Safety Issues   impulsivity  -MJ      Transfer, Comment    Pt stood impulively, VCs to return to sitting.   -CL     Functional Mobility    Functional Mobility- Comment    Deferred.   -CL     Motor Skills/Interventions    Additional Documentation   Balance Skills Training (Group)  -MJ Balance Skills Training (Group)  -CL     Balance Skills Training    Sitting-Level of Assistance   Independent  -MJ Close supervision   d/t impulsivity  -CL     Sitting-Balance Support   Feet supported  -MJ Feet supported  -CL     Standing-Level of Assistance   Contact guard  -MJ      Static Standing Balance Support   No upper extremity supported   Contact gaurd assist  -MJ      Sensory Assessment/Intervention    Light Touch   RLE;LLE   baseline peripheral neuropathy  -MJ LUE;RUE  -CL     LUE Light Touch    WNL  -CL     RUE Light Touch    WNL  -CL     LLE Light Touch   mild impairment  -MJ      RLE Light Touch   mild impairment  -MJ      Positioning and Restraints    Pre-Treatment Position   sitting in chair/recliner  -MJ sitting in chair/recliner  -CL     Post Treatment Position   chair  -MJ chair  -CL     In Chair    with nsg;reclined;exit alarm on;encouraged to call for assist;call light within reach;legs elevated   RN requests patient to remain in chair  -MJ notified nsg;reclined;call light within reach;encouraged to call for assist;exit alarm on;with nsg;legs elevated;with other staff   RN requested pt to remain in chair  -CL       11/09/17 1600 11/09/17 1500             General Information    Equipment Currently Used at Home  cane, straight  -LC       Living Environment    Lives With spouse  -LC        Living Arrangements house  -        Home Accessibility no concerns  -LC        Stair Railings at Home none  -LC        Type of Financial/Environmental Concern none  -LC        Transportation Available car  -        Functional Level Prior    Ambulation 1-->assistive equipment  -LC        Transferring 0-->independent  -LC        Toileting 0-->independent  -LC        Bathing 0-->independent  -LC        Dressing 0-->independent  -LC        Eating 0-->independent  -LC        Communication 0-->understands/communicates without difficulty  -LC        Swallowing 0-->swallows foods/liquids without difficulty  -LC        Prior Functional Level Comment 0  -LC          User Key  (r) = Recorded By, (t) = Taken By, (c) = Cosigned By    Initials Name Effective Dates     Naveen Govea RN 03/14/16 -     LC Tati Bang RN 06/16/16 -     AS Celi Price, RN 06/16/16 -     CL Bernice Mann OT 06/08/16 -     LIS Galvan, PT 10/30/17 -            Occupational Therapy Education     Title: PT OT SLP Therapies (Active)     Topic: Occupational Therapy (Active)     Point: ADL training (Active)    Description: Instruct learner(s) on proper safety adaptation and remediation techniques during self care or transfers.   Instruct in proper use of assistive devices.    Learning Progress Summary    Learner Readiness Method Response Comment Documented by Status   Patient Acceptance E,D NR Pt educated on appropriate safety precautions,  t/f techniques, and role of therapy.  11/10/17 1413 Active               Point: Precautions (Active)    Description: Instruct learner(s) on prescribed precautions during self-care and functional transfers.    Learning Progress Summary    Learner Readiness Method Response Comment Documented by Status   Patient Acceptance E,D NR Pt educated on appropriate safety precautions, t/f techniques, and role of therapy.  11/10/17 1413 Active               Point: Body mechanics (Active)    Description: Instruct learner(s) on proper positioning and spine alignment during self-care, functional mobility activities and/or exercises.    Learning Progress Summary    Learner Readiness Method Response Comment Documented by Status   Patient Acceptance E,D NR Pt educated on appropriate safety precautions, t/f techniques, and role of therapy.  11/10/17 1413 Active                      User Key     Initials Effective Dates Name Provider Type Discipline     06/08/16 -  Bernice Mann OT Occupational Therapist OT                  OT Recommendation and Plan  Anticipated Equipment Needs At Discharge:  (TBA further)  Anticipated Discharge Disposition: inpatient rehabilitation facility  Therapy Frequency: daily  Plan of Care Review  Plan Of Care Reviewed With: patient  Progress: progress toward functional goals is gradual  Outcome Summary/Follow up Plan: OT completed a brief chart review relating to presenting physical deficits. Pt session limited d/t sudden onset HA, visual changes, and significant increase in BP. Recommend pt DC to IP rehab based on current level of performance, however will monitor progress closely. Recommend cont skilled IPOT intervention.           OT Goals       11/10/17 1414          Transfer Training OT LTG    Transfer Training OT LTG, Date Established 11/10/17  -CL      Transfer Training OT LTG, Time to Achieve by discharge  -CL      Transfer Training OT LTG, Activity Type toilet;sit to stand/stand to sit  -CL       Transfer Training OT LTG, [place] Level contact guard assist  -CL      Transfer Training OT LTG, Additional Goal AAD  -CL      Patient Education OT LTG    Patient Education OT LTG, Date Established 11/10/17  -CL      Patient Education OT LTG, Time to Achieve by discharge  -CL      Patient Education OT LTG, Education Type written program;HEP;positioning;posture/body mechanics;home safety;adaptive equipment mgmt;energy conservation  -CL      Patient Education OT LTG, Education Understanding verbalizes understanding  -CL      LB Dressing OT LTG    LB Dressing Goal OT LTG, Date Established 11/10/17  -CL      LB Dressing Goal OT LTG, Time to Achieve by discharge  -CL      LB Dressing Goal OT LTG, Activity Type Don/doff socks  -CL      LB Dressing Goal OT LTG, [place] Level supervision required  -CL      LB Dressing Goal OT LTG, Additional Goal AAD  -CL      Activity Tolerance OT LTG    Activity Tolerance Goal OT LTG, Date Established 11/10/17  -CL      Activity Tolerance Goal OT LTG, Time to Achieve by discharge  -CL      Activity Tolerance Goal OT LTG, Activity Level 10 min activity  -CL      Activity Tolerance Goal OT LTG, Additional Goal 1 seated rest break, VSS  -CL        User Key  (r) = Recorded By, (t) = Taken By, (c) = Cosigned By    Initials Name Provider Type    CL Bernice Mann OT Occupational Therapist                Outcome Measures       11/10/17 1036 11/10/17 1020       How much help from another person do you currently need...    Turning from your back to your side while in flat bed without using bedrails? 3  -MJ      Moving from lying on back to sitting on the side of a flat bed without bedrails? 3  -MJ      Moving to and from a bed to a chair (including a wheelchair)? 3  -MJ      Standing up from a chair using your arms (e.g., wheelchair, bedside chair)? 3  -MJ      Climbing 3-5 steps with a railing? 3  -MJ      To walk in hospital room? 3  -MJ      AM-PAC 6 Clicks Score 18  -MJ      How  much help from another is currently needed...    Putting on and taking off regular lower body clothing?  2  -CL     Bathing (including washing, rinsing, and drying)  2  -CL     Toileting (which includes using toilet bed pan or urinal)  2  -CL     Putting on and taking off regular upper body clothing  3  -CL     Taking care of personal grooming (such as brushing teeth)  3  -CL     Eating meals  3  -CL     Score  15  -CL     Modified [place] Scale    Modified [place] Scale 3 - Moderate disability.  Requiring some help, but able to walk without assistance.  -MJ 3 - Moderate disability.  Requiring some help, but able to walk without assistance.  -CL     Functional Assessment    Outcome Measure Options AM-PAC 6 Clicks Basic Mobility (PT);Modified [place]  -MJ AM-PAC 6 Clicks Daily Activity (OT)  -CL       User Key  (r) = Recorded By, (t) = Taken By, (c) = Cosigned By    Initials Name Provider Type    CL Bernice Mann OT Occupational Therapist    LIS Galvan, PT Physical Therapist          Time Calculation:   OT Start Time: 1020    Therapy Charges for Today     Code Description Service Date Service Provider Modifiers Qty    [id number]  OT EVAL LOW COMPLEXITY 4 11/10/2017 Bernice Mann OT GO 1               Bernice Mann OT  11/10/2017

## 2017-11-10 NOTE — PROGRESS NOTES
Discharge Planning Assessment  [facility]     Patient Name: Vel James  MRN: [id number]  Today's Date: 11/10/2017    Admit Date: 11/9/2017          Discharge Needs Assessment       11/10/17 1412    Living Environment    Lives With spouse    Living Arrangements house   Lives in 1 level home in [facility]. c his wife.  2 steps to get into front door.    Home Accessibility no concerns;bed and bath on same level;stairs to enter home    Number of Stairs to Enter Home 2    Stair Railings at Home none    Type of Financial/Environmental Concern none    Transportation Available car;family or friend will provide    Living Environment    Provides Primary Care For no one    Quality Of Family Relationships supportive    Able to Return to Prior Living Arrangements yes    Discharge Needs Assessment    Concerns To Be Addressed basic needs concerns;adjustment to diagnosis/illness concerns    Readmission Within The Last 30 Days no previous admission in last 30 days    Outpatient/Agency/Support Group Needs --   He has never needed a HH agency.    Equipment Currently Used at Home cane, straight            Discharge Plan       11/10/17 1416    Case Management/Social Work Plan    Plan Home    Patient/Family In Agreement With Plan yes    Additional Comments Spoke c Mr. James and his wife @ BS.  He lives c his wife in 1 level home in [facility].  He uses a straight cane at home, no other DME in home.  He has never used HH or [facility].  His PCP is Dr. Kishan Ceballos.  He uses [facility] on [address] in [place].  His goal is to return home c assist from his wife.  They have children, but [place] of [place].  CM will follow for upcoming d/c needs.  We discussed possible need for rehab.  They are in agreement to rehab if needed.          Discharge Placement     No information found                Demographic Summary     None            Functional Status       11/10/17 1406    Functional Status Current    Ambulation  1-->assistive equipment    Transferring 0-->independent    Toileting 0-->independent    Bathing 0-->independent    Dressing 2-->assistive person    Eating 0-->independent    Communication 0-->understands/communicates without difficulty    Swallowing (if score 2 or more for any item, consult Rehab Services) 0-->swallows foods/liquids without difficulty    Functional Status Prior    Ambulation 1-->assistive equipment    Transferring 0-->independent    Toileting 0-->independent    Bathing 0-->independent    Dressing 0-->independent    Eating 0-->independent    Communication 0-->understands/communicates without difficulty    Swallowing 0-->swallows foods/liquids without difficulty    IADL    Medications independent    Meal Preparation independent    Housekeeping independent    Laundry independent    Shopping independent    Oral Care independent            Psychosocial     None            Abuse/Neglect     None            Legal     None            Substance Abuse     None            Patient Forms     None          Naveen Govea RN

## 2017-11-10 NOTE — PLAN OF CARE
Problem: Patient Care Overview (Adult)  Goal: Plan of Care Review  Outcome: Ongoing (interventions implemented as appropriate)    11/10/17 1541   Coping/Psychosocial Response Interventions   Plan Of Care Reviewed With patient;spouse   Patient Care Overview   Progress (eval)   Outcome Evaluation   Outcome Summary/Follow up Plan Cognitive-communication eval completed per [facility]. 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 Pt very frustrated by deficits. W/ frustration & headache, eval was limited. Pt was able to state basic wants/needs w/o difficulty. Provided edu to pt/his wife re: cognitive-linguistic deficits & compensatory strategies. Further dx tx needed when pt able to participate. SLP will f/u.         Problem: Stroke (Hemorrhagic) (Adult)  Goal: Signs and Symptoms of Listed Potential Problems Will be Absent or Manageable (Stroke)  Outcome: Ongoing (interventions implemented as appropriate)    11/10/17 1541   Stroke (Hemorrhagic)   Problems Assessed (Stroke (Hemorrhagic)) communication impairment   Problems Present (Stroke (Hemorrhagic)) communication impairment         Problem: Inpatient SLP  Goal: Expressive-Patient will improve expressive language skills to allow optimal participation in care  Outcome: Ongoing (interventions implemented as appropriate)    11/10/17 1541   Expressive- Optimal Participation in Care   Expressive Optimal Participation in Care- SLP, Date Established 11/10/17   Expressive Optimal Participation in Care- SLP, Time to Achieve by discharge

## 2017-11-10 NOTE — PROGRESS NOTES
I was called by the nurse several hours ago for a transient episode of hypertension which was followed by some subjective problems with worsening expressive aphasia and some complaints of decreased vision.  I ordered a repeat head CT.    The repeat head CT has been reviewed, and there is no significant change in the small, left temporal intraparenchymal hemorrhage.    From my standpoint, the patient can still safely be transferred to the [facility].  Dispo planning can also be undertaken, and there is no plan for surgical intervention at this time.

## 2017-11-10 NOTE — THERAPY EVALUATION
Acute Care - Physical Therapy Initial Evaluation  [facility]     Patient Name: Vel James  : 1934  MRN: [id number]  Today's Date: 11/10/2017   Onset of Illness/Injury or Date of Surgery Date: 17  Date of Referral to PT: 17  Referring Physician: MD Fabricio      Admit Date: 2017     Visit Dx:    ICD-10-CM ICD-9-CM   1. Impaired functional mobility, balance, gait, and endurance Z74.09 V49.89     Patient Active Problem List   Diagnosis   • ICH (intracerebral hemorrhage), left occipital   • Hypertension   • History of MI (myocardial infarction)   • BPH (benign prostatic hyperplasia)     Past Medical History:   Diagnosis Date   • Asthma, extrinsic    • BPH (benign prostatic hyperplasia)    • Coronary artery disease    • Hypertension    • Macular degeneration syndrome      Past Surgical History:   Procedure Laterality Date   • CARDIAC CATHETERIZATION            PT ASSESSMENT (last 72 hours)      PT Evaluation       11/10/17 1036 17 1600    Rehab Evaluation    Document Type evaluation  -     Subjective Information agree to therapy;complains of;pain   headache  -     Patient Effort, Rehab Treatment good  -     Patient Effort, Rehab Treatment Comment limited by increased headache, dizziness and vision changes  -     Symptoms Noted During/After Treatment increased pain;significant change in vital signs  -     Symptoms Noted Comment noted acute onset of increase in BP, increase in headache and vision changes.  RN notified immediately who was present remainder of session.    -     General Information    Patient Profile Review yes  -MJ     Onset of Illness/Injury or Date of Surgery Date 17  -MJ     Referring Physician MD Fabricio  -MJ     Pertinent History Of Current Problem admitting w/ headache, worsening vision, incoordination. Per CT scan, L occipital lobe intracranial hemorrhage  -MJ     Precautions/Limitations fall precautions  -MJ     Prior Level of Function  independent:;all household mobility;community mobility;ADL's  -MJ     Equipment Currently Used at Home cane, straight  -MJ     Plans/Goals Discussed With patient  -MJ     Risks Reviewed patient:;LOB;dizziness  -MJ     Benefits Reviewed patient:;improve function;increase strength;increase balance;increase independence  -MJ     Barriers to Rehab none identified  -MJ     Living Environment    Lives With spouse  -MJ spouse  -LC    Living Arrangements house  -MJ house  -LC    Home Accessibility no concerns;bed and bath on same level;stairs to enter home  -MJ no concerns  -    Number of Stairs to Enter Home 1  -MJ     Stair Railings at Home none  -MJ none  -LC    Type of Financial/Environmental Concern  none  -LC    Transportation Available  car  -LC    Clinical Impression    Date of Referral to PT 11/09/17  -MJ     PT Diagnosis Decreased mobility, balance and endurance  -MJ     Patient/Family Goals Statement to return to [facility]  -     Criteria for Skilled Therapeutic Interventions Met yes;treatment indicated  -MJ     Rehab Potential good, to achieve stated therapy goals  -MJ     Vital Signs    Pre Systolic BP Rehab 129  -MJ     Pre Treatment Diastolic BP 75  -MJ     Intra Systolic BP Rehab 151  -MJ     Intra Treatment Diastolic   -MJ     Post Systolic BP Rehab 156  -MJ     Post Treatment Diastolic BP 98  -MJ     Pre SpO2 (%) 98  -MJ     O2 Delivery Pre Treatment room air  -MJ     Post SpO2 (%) 98  -MJ     O2 Delivery Post Treatment room air  -MJ     Pain Assessment    Pain Assessment 0-10  -MJ     Pain Score 4  -MJ     Post Pain Score 8  -MJ     Pain Type Acute pain  -MJ     Pain Location Head  -MJ     Pain Orientation Anterior   L forehead  -MJ     Pain Descriptors Headache  -MJ     Pain Onset Sudden   change while seated in chair prior to mobility  -MJ     Pain Intervention(s) Repositioned  -MJ     Response to Interventions RN notified immediately  -MJ     Vision Assessment/Intervention    Visual Impairment  Comment pt. reports change in vision from start to end of evaluation.  RN notified and assessed  -MJ     Cognitive Assessment/Intervention    Current Cognitive/Communication Assessment functional   progressively became confused as evaluation progressed  -MJ     Orientation Status oriented x 4  -MJ     Follows Commands/Answers Questions able to follow single-step instructions;75% of the time  -MJ     Personal Safety decreased awareness, need for assist;decreased awareness, need for safety  -MJ     Personal Safety Interventions fall prevention program maintained;gait belt;nonskid shoes/slippers when out of bed  -MJ     ROM (Range of Motion)    General ROM no range of motion deficits identified  -MJ     MMT (Manual Muscle Testing)    General MMT Assessment no strength deficits identified  -MJ     General MMT Assessment Detail 4/5 throughout BLE  -MJ     Transfer Assessment/Treatment    Transfers, Sit-Stand [place] contact guard assist  -MJ     Transfers, Stand-Sit [place] minimum assist (75% patient effort)  -MJ     Transfer, Safety Issues impulsivity  -MJ     Gait Assessment/Treatment    Gait, Lincoln Level not appropriate to assess   increase in BP and headache  -MJ     Motor Skills/Interventions    Additional Documentation Balance Skills Training (Group)  -MJ     Balance Skills Training    Sitting-Level of Assistance Independent  -MJ     Sitting-Balance Support Feet supported  -MJ     Standing-Level of Assistance Contact guard  -MJ     Static Standing Balance Support No upper extremity supported   Contact gaurd assist  -MJ     Sensory Assessment/Intervention    Light Touch RLE;LLE   baseline peripheral neuropathy  -MJ     LLE Light Touch mild impairment  -MJ     RLE Light Touch mild impairment  -MJ     Positioning and Restraints    Pre-Treatment Position sitting in chair/recliner  -MJ     Post Treatment Position chair  -MJ     In Chair with nsg;reclined;exit alarm on;encouraged to call for assist;call  light within reach;legs elevated   RN requests patient to remain in chair  -       11/09/17 1500       General Information    Equipment Currently Used at Home cane, straight  -LC       User Key  (r) = Recorded By, (t) = Taken By, (c) = Cosigned By    Initials Name Provider Type     Tati Bang, RN Registered Nurse     Mirela Galvan, PT Physical Therapist          Physical Therapy Education     Title: PT OT SLP Therapies (Active)     Topic: Physical Therapy (Active)     Point: Mobility training (Done)    Learning Progress Summary    Learner Readiness Method Response Comment Documented by Status   Patient Acceptance E VU,NR   11/10/17 1201 Done               Point: Body mechanics (Done)    Learning Progress Summary    Learner Readiness Method Response Comment Documented by Status   Patient Acceptance E VU,NR   11/10/17 1201 Done                      User Key     Initials Effective Dates Name Provider Type Discipline     10/30/17 -  Mirela Galvan, PT Physical Therapist PT                PT Recommendation and Plan  Anticipated Discharge Disposition: inpatient rehabilitation facility  PT Frequency: daily  Plan of Care Review  Plan Of Care Reviewed With: patient  Outcome Summary/Follow up Plan: pt. presents with unstable characteristics to include increase in headache, visual changes, and increase in blood pressure.  PT warrented to improve safety and independence with mobility and progress towards ambulation as medically appropriate.          IP PT Goals       11/10/17 1202          Bed Mobility PT LTG    Bed Mobility PT LTG, Date Established 11/10/17  -      Bed Mobility PT LTG, Time to Achieve 2 wks  -      Bed Mobility PT LTG, Activity Type supine to sit/sit to supine  -      Bed Mobility PT LTG, [place] Level independent  -      Bed Mobility PT Goal  LTG, Assist Device bed rails  -      Transfer Training PT LTG    Transfer Training PT LTG, Date Established 11/10/17  -       Transfer Training PT LTG, Time to Achieve 2 wks  -MJ      Transfer Training PT LTG, Activity Type bed to chair /chair to bed  -MJ      Transfer Training PT LTG, Travis Level supervision required  -MJ      Transfer Training PT LTG, Assist Device walker, rolling  -MJ      Gait Training PT LTG    Gait Training Goal PT LTG, Date Established 11/10/17  -MJ      Gait Training Goal PT LTG, Time to Achieve 2 wks  -MJ      Gait Training Goal PT LTG, [place] Level supervision required  -MJ      Gait Training Goal PT LTG, Assist Device walker, rolling  -MJ      Gait Training Goal PT LTG, Distance to Achieve 300'  -MJ        User Key  (r) = Recorded By, (t) = Taken By, (c) = Cosigned By    Initials Name Provider Type    LIS Galvan PT Physical Therapist                Outcome Measures       11/10/17 1036          How much help from another person do you currently need...    Turning from your back to your side while in flat bed without using bedrails? 3  -MJ      Moving from lying on back to sitting on the side of a flat bed without bedrails? 3  -MJ      Moving to and from a bed to a chair (including a wheelchair)? 3  -MJ      Standing up from a chair using your arms (e.g., wheelchair, bedside chair)? 3  -MJ      Climbing 3-5 steps with a railing? 3  -MJ      To walk in hospital room? 3  -MJ      AM-PAC 6 Clicks Score 18  -MJ      Modified Butts Scale    Modified Fide Scale 3 - Moderate disability.  Requiring some help, but able to walk without assistance.  -MJ      Functional Assessment    Outcome Measure Options AM-PAC 6 Clicks Basic Mobility (PT);Modified Fide  -MJ        User Key  (r) = Recorded By, (t) = Taken By, (c) = Cosigned By    Initials Name Provider Type    LIS Galvan, PT Physical Therapist           Time Calculation:         PT Charges       11/10/17 1219          Time Calculation    Start Time 1036  -MJ      PT Received On 11/10/17  -      PT Goal Re-Cert Due Date 11/20/17  -         User Key  (r) = Recorded By, (t) = Taken By, (c) = Cosigned By    Initials Name Provider Type     Mirela Galvan, PT Physical Therapist          Therapy Charges for Today     Code Description Service Date Service Provider Modifiers Qty    [id number]  PT EVAL HIGH COMPLEXITY 4 11/10/2017 Mirela Galvan, PT GP 1          PT G-Codes  Outcome Measure Options: AM-PAC 6 Clicks Basic Mobility (PT), Modified Fideradha Galvan, PT  11/10/2017

## 2017-11-10 NOTE — PLAN OF CARE
Problem: Patient Care Overview (Adult)  Goal: Plan of Care Review  Outcome: Ongoing (interventions implemented as appropriate)  Goal: Adult Individualization and Mutuality  Outcome: Ongoing (interventions implemented as appropriate)  Goal: Discharge Needs Assessment  Outcome: Ongoing (interventions implemented as appropriate)    Problem: Stroke (Hemorrhagic) (Adult)  Goal: Signs and Symptoms of Listed Potential Problems Will be Absent or Manageable (Stroke)  Outcome: Ongoing (interventions implemented as appropriate)    Problem: Fall Risk (Adult)  Goal: Identify Related Risk Factors and Signs and Symptoms  Outcome: Ongoing (interventions implemented as appropriate)  Goal: Absence of Falls  Outcome: Ongoing (interventions implemented as appropriate)

## 2017-11-11 LAB
ANION GAP SERPL CALCULATED.3IONS-SCNC: 6 MMOL/L (ref 3–11)
BASOPHILS # BLD AUTO: 0.02 10*3/MM3 (ref 0–0.2)
BASOPHILS NFR BLD AUTO: 0.2 % (ref 0–1)
BUN BLD-MCNC: 19 MG/DL (ref 9–23)
BUN/CREAT SERPL: 21.1 (ref 7–25)
CALCIUM SPEC-SCNC: 9.3 MG/DL (ref 8.7–10.4)
CHLORIDE SERPL-SCNC: 108 MMOL/L (ref 99–109)
CO2 SERPL-SCNC: 26 MMOL/L (ref 20–31)
CREAT BLD-MCNC: 0.9 MG/DL (ref 0.6–1.3)
DEPRECATED RDW RBC AUTO: 81.9 FL (ref 37–54)
EOSINOPHIL # BLD AUTO: 0.12 10*3/MM3 (ref 0–0.3)
EOSINOPHIL NFR BLD AUTO: 1 % (ref 0–3)
ERYTHROCYTE [DISTWIDTH] IN BLOOD BY AUTOMATED COUNT: 22.9 % (ref 11.3–14.5)
GFR SERPL CREATININE-BSD FRML MDRD: 81 ML/MIN/1.73
GLUCOSE BLD-MCNC: 100 MG/DL (ref 70–100)
HCT VFR BLD AUTO: 37.9 % (ref 38.9–50.9)
HGB BLD-MCNC: 12.4 G/DL (ref 13.1–17.5)
IMM GRANULOCYTES # BLD: 0.03 10*3/MM3 (ref 0–0.03)
IMM GRANULOCYTES NFR BLD: 0.3 % (ref 0–0.6)
LYMPHOCYTES # BLD AUTO: 1.78 10*3/MM3 (ref 0.6–4.8)
LYMPHOCYTES NFR BLD AUTO: 15.1 % (ref 24–44)
MCH RBC QN AUTO: 32 PG (ref 27–31)
MCHC RBC AUTO-ENTMCNC: 32.7 G/DL (ref 32–36)
MCV RBC AUTO: 97.9 FL (ref 80–99)
MONOCYTES # BLD AUTO: 1.69 10*3/MM3 (ref 0–1)
MONOCYTES NFR BLD AUTO: 14.4 % (ref 0–12)
NEUTROPHILS # BLD AUTO: 8.11 10*3/MM3 (ref 1.5–8.3)
NEUTROPHILS NFR BLD AUTO: 69 % (ref 41–71)
PLATELET # BLD AUTO: 210 10*3/MM3 (ref 150–450)
PMV BLD AUTO: 10.6 FL (ref 6–12)
POTASSIUM BLD-SCNC: 3.8 MMOL/L (ref 3.5–5.5)
RBC # BLD AUTO: 3.87 10*6/MM3 (ref 4.2–5.76)
SODIUM BLD-SCNC: 140 MMOL/L (ref 132–146)
WBC NRBC COR # BLD: 11.75 10*3/MM3 (ref 3.5–10.8)

## 2017-11-11 PROCEDURE — 94640 AIRWAY INHALATION TREATMENT: CPT

## 2017-11-11 PROCEDURE — 99232 SBSQ HOSP IP/OBS MODERATE 35: CPT | Performed by: INTERNAL MEDICINE

## 2017-11-11 PROCEDURE — 80048 BASIC METABOLIC PNL TOTAL CA: CPT | Performed by: INTERNAL MEDICINE

## 2017-11-11 PROCEDURE — 94799 UNLISTED PULMONARY SVC/PX: CPT

## 2017-11-11 PROCEDURE — 25010000002 ONDANSETRON PER 1 MG: Performed by: NURSE PRACTITIONER

## 2017-11-11 PROCEDURE — 25010000002 HYDRALAZINE PER 20 MG: Performed by: INTERNAL MEDICINE

## 2017-11-11 PROCEDURE — 85025 COMPLETE CBC W/AUTO DIFF WBC: CPT | Performed by: INTERNAL MEDICINE

## 2017-11-11 PROCEDURE — 92507 TX SP LANG VOICE COMM INDIV: CPT

## 2017-11-11 PROCEDURE — 94760 N-INVAS EAR/PLS OXIMETRY 1: CPT

## 2017-11-11 RX ORDER — PANTOPRAZOLE SODIUM 40 MG/1
40 TABLET, DELAYED RELEASE ORAL
Status: DISCONTINUED | OUTPATIENT
Start: 2017-11-12 | End: 2017-11-14 | Stop reason: HOSPADM

## 2017-11-11 RX ORDER — IPRATROPIUM BROMIDE AND ALBUTEROL SULFATE 2.5; .5 MG/3ML; MG/3ML
3 SOLUTION RESPIRATORY (INHALATION)
Status: DISCONTINUED | OUTPATIENT
Start: 2017-11-11 | End: 2017-11-14 | Stop reason: HOSPADM

## 2017-11-11 RX ORDER — HYDRALAZINE HYDROCHLORIDE 20 MG/ML
10 INJECTION INTRAMUSCULAR; INTRAVENOUS EVERY 6 HOURS PRN
Status: DISCONTINUED | OUTPATIENT
Start: 2017-11-11 | End: 2017-11-14 | Stop reason: HOSPADM

## 2017-11-11 RX ORDER — PANTOPRAZOLE SODIUM 40 MG/1
40 TABLET, DELAYED RELEASE ORAL ONCE
Status: COMPLETED | OUTPATIENT
Start: 2017-11-11 | End: 2017-11-11

## 2017-11-11 RX ADMIN — NICARDIPINE HYDROCHLORIDE 5 MG/HR: 2.5 INJECTION INTRAVENOUS at 11:12

## 2017-11-11 RX ADMIN — ONDANSETRON 4 MG: 2 INJECTION INTRAMUSCULAR; INTRAVENOUS at 17:33

## 2017-11-11 RX ADMIN — HYDRALAZINE HYDROCHLORIDE 10 MG: 20 INJECTION INTRAMUSCULAR; INTRAVENOUS at 15:54

## 2017-11-11 RX ADMIN — NICARDIPINE HYDROCHLORIDE 7.5 MG/HR: 2.5 INJECTION INTRAVENOUS at 06:11

## 2017-11-11 RX ADMIN — PANTOPRAZOLE SODIUM 40 MG: 40 TABLET, DELAYED RELEASE ORAL at 15:58

## 2017-11-11 RX ADMIN — IPRATROPIUM BROMIDE AND ALBUTEROL SULFATE 3 ML: .5; 3 SOLUTION RESPIRATORY (INHALATION) at 16:32

## 2017-11-11 RX ADMIN — IPRATROPIUM BROMIDE AND ALBUTEROL SULFATE 3 ML: .5; 3 SOLUTION RESPIRATORY (INHALATION) at 20:09

## 2017-11-11 RX ADMIN — ATENOLOL 25 MG: 25 TABLET ORAL at 08:18

## 2017-11-11 NOTE — THERAPY TREATMENT NOTE
Acute Care - Speech Language Pathology Treatment Note  [facility]     Patient Name: Vel James  : 1934  MRN: [id number]  Today's Date: 2017         Admit Date: 2017    Visit Dx:      ICD-10-CM ICD-9-CM   1. Impaired functional mobility, balance, gait, and endurance Z74.09 V49.89   2. Impaired mobility and ADLs Z74.09 799.89   3. 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 315.32     Patient Active Problem List   Diagnosis   • ICH (intracerebral hemorrhage), left occipital   • Hypertension   • History of MI (myocardial infarction)   • BPH (benign prostatic hyperplasia)              Adult Rehabilitation Note       17 1100          Rehab Assessment/Intervention    Discipline speech language pathologist  -LS      Document Type therapy note (daily note)  -LS      Recorded by [LS] Martine Perez MS CCC-SLP      Cognitive Assessment/Intervention    Additional Documentation Cognitive Assessment Intervention (Group)  -LS      Recorded by [LS] Martine Perez MS CCC-SLP      Cognitive Assessment Intervention    Behavior/Mood Observations alert;behavior appropriate to situation, WNL/WFL  -LS      Attention --  -LS      Attending Skill Interventions Fuctional for tasks fo his choosing. Difficulty to engage pt in further assessment tasks  -LS      Organization WNL/WFL  -LS      Recorded by [LS] Martine Perez MS CCC-SLP      Communication Assessment/Intervention    Additional Documentation Auditory Comprehen Assess/Intervention (Group);Verbal Expression Assess/Intervention (Group);Motor Speech Assessment/Intervention (Group)  -LS      Recorded by [LS] Martine Perez MS CCC-SLP      Auditory Comprehen Assess/Intervention    Auditory Comprehension Comment Suspect functional. Patient dominated conversation but answered appropriatly when SLP inquired on various topics.   -LS      Recorded by [LS] Martine Perez MS CCC-SLP      Improve word retrieval skills    Improve word retrieval skills by:  "completing functional word finding tasks;80%;with inconsistent cues  -LS      Status: Improve word retrieval skills Progressing as expected  -LS      Word Retrieval Skills Progress 60%;without cues  -LS      Comments: Improve word retrieval skills Difficulty with word finding noted in conversation but pt was often able to talk around word. Pt presented with difficulty in structured naming activites but appeared to be approving. It appeared he would avoid tasks he thought were challenging and stated \"he wanted to work it out himself\"  -LS      Recorded by [LS] Martine Perez MS CCC-SLP        User Key  (r) = Recorded By, (t) = Taken By, (c) = Cosigned By    Initials Name Effective Dates    LS Martine Perez MS CCC-SLP 06/22/15 -               IP SLP Goals       11/11/17 1210 11/10/17 1541       Expressive- Optimal Participation in Care    Expressive Optimal Participation in Care- SLP, Date Established 11/11/17  -LS 11/10/17  -AC     Expressive Optimal Participation in Care- SLP, Time to Achieve by discharge  - by discharge  -     Expressive Optimal Participation in Care- SLP, Date Goal Reviewed 11/11/17  -LS      Expressive Optimal Participation in Care- SLP, Outcome goal ongoing  -LS        User Key  (r) = Recorded By, (t) = Taken By, (c) = Cosigned By    Initials Name Provider Type    TINY Serrano MS CCC-SLP Speech and Language Pathologist    KAT Perez MS CCC-SLP Speech and Language Pathologist          EDUCATION  The patient has been educated in the following areas:   Cognitive Impairment Communication Impairment.    SLP Recommendation and Plan                               Plan of Care Review  Plan Of Care Reviewed With: patient  Outcome Summary/Follow up Plan: Communication tx complete. Pt is improving but continues to present with word-finding difficulties. Pt observed to have some task avoidance behavior as he was limited on topics he would discuss and change topic if task was " "challenging.  SLP provided education on benefits of ongoing  SLP services in OP however pt appeared resistant stating it needed time and  for him to  \"work it out himself.\" SLP will continue to follow for tx.          Time Calculation:         Time Calculation- SLP       11/11/17 1214          Time Calculation- SLP    SLP Start Time 1100  -LS      SLP Received On 11/11/17  -        User Key  (r) = Recorded By, (t) = Taken By, (c) = Cosigned By    Initials Name Provider Type     Martine Perez MS CCC-SLP Speech and Language Pathologist          Therapy Charges for Today     Code Description Service Date Service Provider Modifiers Qty    [id number]  ST TREATMENT SPEECH 3 11/11/2017 Martine Perez MS CCC-SLP GN 1               Martine Perez MS CCC-SLP  11/11/2017  "

## 2017-11-11 NOTE — PLAN OF CARE
"Problem: Patient Care Overview (Adult)  Goal: Plan of Care Review  Outcome: Ongoing (interventions implemented as appropriate)    11/11/17 1210   Coping/Psychosocial Response Interventions   Plan Of Care Reviewed With patient   Outcome Evaluation   Outcome Summary/Follow up Plan Communication tx complete. Pt is improving but continues to present with word-finding difficulties. Pt observed to have some task avoidance behavior as he was limited on topics he would discuss and change topic if task was challenging. SLP provided education on benefits of ongoing SLP services in OP however pt appeared resistant stating it needed time and for him to \"work it oui himself.\" SLP will continue to follow for tx.         Problem: Inpatient SLP  Goal: Expressive-Patient will improve expressive language skills to allow optimal participation in care  Outcome: Ongoing (interventions implemented as appropriate)    11/11/17 1210   Expressive- Optimal Participation in Care   Expressive Optimal Participation in Care- SLP, Date Established 11/11/17   Expressive Optimal Participation in Care- SLP, Time to Achieve by discharge   Expressive Optimal Participation in Care- SLP, Date Goal Reviewed 11/11/17   Expressive Optimal Participation in Care- SLP, Outcome goal ongoing           "

## 2017-11-11 NOTE — PROGRESS NOTES
"Critical Care Note     LOS: 2 days   Patient Care Team:  Kishan Ceballos MD as PCP - General (Family Medicine)    Chief Complaint/Reason for visit: Left temporal hemorrhage        Subjective     Interval History:     83-year-old gentleman hospitalized with a left temporal intraparenchymal hemorrhage. Yesterday he developed some transient worsening in his speech and decreased vision. Repeat CT scan was unchanged. This morning he does have a persistent headache. Nursing notes a productive cough. He also has some heartburn which is a new symptom for him. He is tolerating a by mouth diet without nausea or vomiting.     Review of Systems:    All systems were reviewed and negative except as noted in subjective.    Medical history, surgical history, social history, family history reviewed    Objective     Intake/Output:    Intake/Output Summary (Last 24 hours) at 11/11/17 1426  Last data filed at 11/11/17 1112   Gross per 24 hour   Intake              705 ml   Output             1100 ml   Net             -395 ml       Nutrition:  Diet Regular; Thin; Cardiac    Infusions:    niCARdipine 5-15 mg/hr Last Rate: 5 mg/hr (11/11/17 1112)   phenylephrine 0.5-3 mcg/kg/min          Telemetry:  Sinus Rhythm: normal sinus rhythm          Vital Signs  Blood pressure 122/84, pulse 73, temperature 98 °F (36.7 °C), temperature source Oral, resp. rate 17, height 67\" (170.2 cm), weight 224 lb 13.9 oz (102 kg), SpO2 94 %.    Physical Exam:  General Appearance:  Overweight elderly gentleman in no distress    Head:  Normocephalic, atraumatic    Eyes:          Pupils equal reactive to light, no jaundice    Ears:     Throat: Oral mucosa moist    Neck: Trachea midline, no palpable thyroid    Back:      Lungs:   Symmetric chest excursion with scattered expiratory rhonchi on forced expiration, no wheeze     Heart:  Regular, normal S1, S2, no murmur    Abdomen:   Bowel sounds active, nondistended, soft    Rectal:   Deferred   Extremities: No pitting " edema or cyanosis    Pulses: Pedal pulses present    Skin: Warm and dry    Lymph nodes:    Neurologic: Alert and oriented. Pupils equal reactive to light. Handgrip symmetric. Fine motor skills are clumsy, some mild difficulty with word finding       Results Review:     I reviewed the patient's new clinical results.     Results from last 7 days  Lab Units 11/11/17  0401 11/10/17  0413   SODIUM mmol/L 140 139   POTASSIUM mmol/L 3.8 4.0   CHLORIDE mmol/L 108 109   CO2 mmol/L 26.0 27.0   BUN mg/dL 19 18   CREATININE mg/dL 0.90 0.90   CALCIUM mg/dL 9.3 8.9   GLUCOSE mg/dL 100 86       Results from last 7 days  Lab Units 11/11/17  0401 11/10/17  0413   WBC 10*3/mm3 11.75* 8.42   HEMOGLOBIN g/dL 12.4* 12.1*   HEMATOCRIT % 37.9* 35.9*   PLATELETS 10*3/mm3 210 204         No results found for: BLOODCX  No results found for: URINECX    I reviewed the patient's new imaging including images and reports.      All medications reviewed.     atenolol 25 mg Oral Daily   ipratropium-albuterol 3 mL Nebulization 4x Daily - RT   pantoprazole 40 mg Oral Once   [START ON 11/12/2017] pantoprazole 40 mg Oral Q AM         Assessment/Plan     Principal Problem:    ICH (intracerebral hemorrhage), left occipital  Active Problems:    Hypertension    History of MI (myocardial infarction)    BPH (benign prostatic hyperplasia)          PLAN:  Neurochecks every shift  Up with assistance  OT/PT/speech  Start Protonix for heartburn  Start nebulized bronchodilators for productive cough  Continue atenolol for his hypertension  Telemetry    VTE Prophylaxis:SCDS    Stress Ulcer Prophylaxis:protonix    Aleja Rodriguez MD  11/11/17  2:26 PM      Time: 25min

## 2017-11-12 LAB
ALBUMIN SERPL-MCNC: 4 G/DL (ref 3.2–4.8)
ALBUMIN/GLOB SERPL: 2 G/DL (ref 1.5–2.5)
ALP SERPL-CCNC: 56 U/L (ref 25–100)
ALT SERPL W P-5'-P-CCNC: 14 U/L (ref 7–40)
ANION GAP SERPL CALCULATED.3IONS-SCNC: 6 MMOL/L (ref 3–11)
AST SERPL-CCNC: 24 U/L (ref 0–33)
BILIRUB SERPL-MCNC: 3.1 MG/DL (ref 0.3–1.2)
BUN BLD-MCNC: 22 MG/DL (ref 9–23)
BUN/CREAT SERPL: 24.4 (ref 7–25)
CALCIUM SPEC-SCNC: 8.8 MG/DL (ref 8.7–10.4)
CHLORIDE SERPL-SCNC: 108 MMOL/L (ref 99–109)
CO2 SERPL-SCNC: 27 MMOL/L (ref 20–31)
CREAT BLD-MCNC: 0.9 MG/DL (ref 0.6–1.3)
DEPRECATED RDW RBC AUTO: 79.7 FL (ref 37–54)
ERYTHROCYTE [DISTWIDTH] IN BLOOD BY AUTOMATED COUNT: 22.7 % (ref 11.3–14.5)
GFR SERPL CREATININE-BSD FRML MDRD: 81 ML/MIN/1.73
GLOBULIN UR ELPH-MCNC: 2 GM/DL
GLUCOSE BLD-MCNC: 91 MG/DL (ref 70–100)
HCT VFR BLD AUTO: 34.8 % (ref 38.9–50.9)
HGB BLD-MCNC: 11.9 G/DL (ref 13.1–17.5)
MAGNESIUM SERPL-MCNC: 2.3 MG/DL (ref 1.3–2.7)
MCH RBC QN AUTO: 32.8 PG (ref 27–31)
MCHC RBC AUTO-ENTMCNC: 34.2 G/DL (ref 32–36)
MCV RBC AUTO: 95.9 FL (ref 80–99)
PHOSPHATE SERPL-MCNC: 2.9 MG/DL (ref 2.4–5.1)
PLATELET # BLD AUTO: 199 10*3/MM3 (ref 150–450)
PMV BLD AUTO: 10.2 FL (ref 6–12)
POTASSIUM BLD-SCNC: 3.9 MMOL/L (ref 3.5–5.5)
PROT SERPL-MCNC: 6 G/DL (ref 5.7–8.2)
RBC # BLD AUTO: 3.63 10*6/MM3 (ref 4.2–5.76)
SODIUM BLD-SCNC: 141 MMOL/L (ref 132–146)
WBC NRBC COR # BLD: 12.03 10*3/MM3 (ref 3.5–10.8)

## 2017-11-12 PROCEDURE — 83735 ASSAY OF MAGNESIUM: CPT | Performed by: INTERNAL MEDICINE

## 2017-11-12 PROCEDURE — 84100 ASSAY OF PHOSPHORUS: CPT | Performed by: INTERNAL MEDICINE

## 2017-11-12 PROCEDURE — 99232 SBSQ HOSP IP/OBS MODERATE 35: CPT | Performed by: NURSE PRACTITIONER

## 2017-11-12 PROCEDURE — 94799 UNLISTED PULMONARY SVC/PX: CPT

## 2017-11-12 PROCEDURE — 97116 GAIT TRAINING THERAPY: CPT

## 2017-11-12 PROCEDURE — 85027 COMPLETE CBC AUTOMATED: CPT | Performed by: INTERNAL MEDICINE

## 2017-11-12 PROCEDURE — 80053 COMPREHEN METABOLIC PANEL: CPT | Performed by: INTERNAL MEDICINE

## 2017-11-12 PROCEDURE — 94640 AIRWAY INHALATION TREATMENT: CPT

## 2017-11-12 RX ADMIN — IPRATROPIUM BROMIDE AND ALBUTEROL SULFATE 3 ML: .5; 3 SOLUTION RESPIRATORY (INHALATION) at 20:38

## 2017-11-12 RX ADMIN — IPRATROPIUM BROMIDE AND ALBUTEROL SULFATE 3 ML: .5; 3 SOLUTION RESPIRATORY (INHALATION) at 07:14

## 2017-11-12 RX ADMIN — IPRATROPIUM BROMIDE AND ALBUTEROL SULFATE 3 ML: .5; 3 SOLUTION RESPIRATORY (INHALATION) at 12:29

## 2017-11-12 RX ADMIN — IPRATROPIUM BROMIDE AND ALBUTEROL SULFATE 3 ML: .5; 3 SOLUTION RESPIRATORY (INHALATION) at 15:53

## 2017-11-12 RX ADMIN — ATENOLOL 25 MG: 25 TABLET ORAL at 10:00

## 2017-11-12 NOTE — THERAPY TREATMENT NOTE
Acute Care - Physical Therapy Treatment Note  [facility]     Patient Name: Vel James  : 1934  MRN: [id number]  Today's Date: 2017  Onset of Illness/Injury or Date of Surgery Date: 17  Date of Referral to PT: 17  Referring Physician: MD Fabricio    Admit Date: 2017    Visit Dx:    ICD-10-CM ICD-9-CM   1. Impaired functional mobility, balance, gait, and endurance Z74.09 V49.89   2. Impaired mobility and ADLs Z74.09 799.89   3. 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 315.32     Patient Active Problem List   Diagnosis   • ICH (intracerebral hemorrhage), left occipital   • Hypertension   • History of MI (myocardial infarction)   • BPH (benign prostatic hyperplasia)               Adult Rehabilitation Note       17 1350 17 1100       Rehab Assessment/Intervention    Discipline physical therapist  -MM speech language pathologist  -LS     Document Type therapy note (daily note)  -MM therapy note (daily note)  -LS     Subjective Information no complaints;agree to therapy  -MM      Patient Effort, Rehab Treatment good  -MM      Symptoms Noted During/After Treatment none  -MM      Precautions/Limitations fall precautions  -MM      Precautions/Limitations, Vision WFL  -MM      Precautions/Limitations, Hearing WFL  -MM      Patient Response to Treatment good  -MM      Recorded by [MM] Ita Anderson, PT [LS] Martine Perez MS CCC-SLP     Vital Signs    Pre Systolic BP Rehab 127  -MM      Pre Treatment Diastolic BP 66  -MM      Pretreatment Heart Rate (beats/min) 66  -MM      O2 Delivery Pre Treatment room air  -MM      Pre Patient Position Sitting  -MM      Intra Patient Position Standing  -MM      Post Patient Position Sitting  -MM      Recorded by [MM] Ita Anderson, PT      Pain Assessment    Pain Assessment 0-10  -MM      Pain Score 2  -MM      Post Pain Score 2  -MM      Pain Type Acute pain  -MM      Pain Location Head  -MM      Recorded by [MM] Ita Anderson,  PT      Cognitive Assessment/Intervention    Current Cognitive/Communication Assessment functional  -MM      Orientation Status oriented x 4  -MM      Follows Commands/Answers Questions 100% of the time  -MM      Personal Safety WNL/WFL  -MM      Personal Safety Interventions fall prevention program maintained;gait belt;nonskid shoes/slippers when out of bed  -MM      Additional Documentation  Cognitive Assessment Intervention (Group)  -LS     Recorded by [MM] Ita Anderson, PT [LS] Martine Perez, MS CCC-SLP     Cognitive Assessment Intervention    Behavior/Mood Observations  alert;behavior appropriate to situation, WNL/WFL  -LS     Attention  --  -LS     Attending Skill Interventions  Fuctional for tasks fo his choosing. Difficulty to engage pt in further assessment tasks  -LS     Organization  WNL/WFL  -LS     Recorded by  [LS] Martine Perez, MS CCC-SLP     Communication Assessment/Intervention    Additional Documentation  Auditory Comprehen Assess/Intervention (Group);Verbal Expression Assess/Intervention (Group);Motor Speech Assessment/Intervention (Group)  -LS     Recorded by  [LS] Martine Perez MS CCC-SLP     Auditory Comprehen Assess/Intervention    Auditory Comprehension Comment  Suspect functional. Patient dominated conversation but answered appropriatly when SLP inquired on various topics.   -LS     Recorded by  [LS] Martine Perez, MS CCC-SLP     Improve word retrieval skills    Improve word retrieval skills by:  completing functional word finding tasks;80%;with inconsistent cues  -LS     Status: Improve word retrieval skills  Progressing as expected  -LS     Word Retrieval Skills Progress  60%;without cues  -LS     Comments: Improve word retrieval skills  Difficulty with word finding noted in conversation but pt was often able to talk around word. Pt presented with difficulty in structured naming activites but appeared to be approving. It appeared he would avoid tasks he thought were  "challenging and stated \"he wanted to work it out himself\"  -LS     Recorded by  [LS] Martine Perez, MS CCC-SLP     Bed Mobility, Assessment/Treatment    Bed Mobility, Comment UIC  -MM      Recorded by [MM] Ita Anderson, PT      Transfer Assessment/Treatment    Transfers, Sit-Stand [place] stand by assist  -MM      Transfers, Stand-Sit [place] stand by assist  -MM      Recorded by [MM] Ita Anderson, PT      Gait Assessment/Treatment    Gait, [place] Level contact guard assist  -MM      Gait, Assistive Device rolling walker  -MM      Gait, Distance (Feet) 200  -MM      Gait, Gait Pattern Analysis swing-through gait  -MM      Gait, Gait Deviations forward flexed posture  -MM      Gait, Impairments impaired balance;strength decreased  -MM      Gait, Comment would benefit from trunk work to improve strength and posture  -MM      Recorded by [MM] Ita Anderson, PT      Balance Skills Training    Sitting-Level of Assistance Distant supervision  -MM      Sitting-Balance Support Feet supported  -MM      Standing-Level of Assistance Close supervision  -MM      Static Standing Balance Support assistive device  -MM      Recorded by [MM] Ita Anderson, PT      Positioning and Restraints    Pre-Treatment Position sitting in chair/recliner  -MM      Post Treatment Position chair  -MM      In Chair sitting;call light within reach;encouraged to call for assist;exit alarm on;with family/caregiver  -MM      Recorded by [MM] Ita Anderson, PT        User Key  (r) = Recorded By, (t) = Taken By, (c) = Cosigned By    Initials Name Effective Dates    MM Ita Anderson, PT 06/19/15 -     LS Martine Perez, MS CCC-SLP 06/22/15 -                 IP PT Goals       11/12/17 1427 11/10/17 1202       Bed Mobility PT LTG    Bed Mobility PT LTG, Date Established  11/10/17  -MJ     Bed Mobility PT LTG, Time to Achieve  2 wks  -MJ     Bed Mobility PT LTG, Activity Type  supine to sit/sit to supine  -MJ     " Bed Mobility PT LTG, [place]ie Level  independent  -MJ     Bed Mobility PT Goal  LTG, Assist Device  bed rails  -MJ     Bed Mobility PT LTG, Outcome goal ongoing  -MM      Transfer Training PT LTG    Transfer Training PT LTG, Date Established  11/10/17  -MJ     Transfer Training PT LTG, Time to Achieve  2 wks  -MJ     Transfer Training PT LTG, Activity Type  bed to chair /chair to bed  -MJ     Transfer Training PT LTG, [place] Level  supervision required  -MJ     Transfer Training PT LTG, Assist Device  walker, rolling  -MJ     Transfer Training PT LTG, Outcome goal ongoing  -MM      Gait Training PT LTG    Gait Training Goal PT LTG, Date Established  11/10/17  -MJ     Gait Training Goal PT LTG, Time to Achieve  2 wks  -MJ     Gait Training Goal PT LTG, [place]ie Level  supervision required  -MJ     Gait Training Goal PT LTG, Assist Device  walker, rolling  -MJ     Gait Training Goal PT LTG, Distance to Achieve  300'  -MJ     Gait Training Goal PT LTG, Outcome goal ongoing  -MM        User Key  (r) = Recorded By, (t) = Taken By, (c) = Cosigned By    Initials Name Provider Type    MM Ita Anderson, PT Physical Therapist    LIS Galvan, PT Physical Therapist          Physical Therapy Education     Title: PT OT SLP Therapies (Active)     Topic: Physical Therapy (Done)     Point: Mobility training (Done)    Learning Progress Summary    Learner Readiness Method Response Comment Documented by Status   Patient Acceptance E JESSICA FENG   11/12/17 1427 Done    Acceptance E MALU FENG 11/10/17 1201 Done   Family Acceptance E JESSICA FENG MM 11/12/17 1427 Done               Point: Home exercise program (Done)    Learning Progress Summary    Learner Readiness Method Response Comment Documented by Status   Patient Acceptance E JESSICA FENG MM 11/12/17 1427 Done   Family Acceptance E JESSICA FENG   11/12/17 1427 Done               Point: Body mechanics (Done)    Learning Progress Summary    Learner Readiness Method Response  Comment Documented by Status   Patient Acceptance E VU,DU  MM 11/12/17 1427 Done    Acceptance E VU,NR  MJ 11/10/17 1201 Done   Family Acceptance E PINGDU  MM 11/12/17 1427 Done               Point: Precautions (Done)    Learning Progress Summary    Learner Readiness Method Response Comment Documented by Status   Patient Acceptance E VU,DU  MM 11/12/17 1427 Done    Acceptance E VU  KG 11/12/17 0404 Done   Family Acceptance E PINGDU  MM 11/12/17 1427 Done                      User Key     Initials Effective Dates Name Provider Type Discipline     06/19/15 -  Ita Anderson, PT Physical Therapist PT    KG 06/16/16 -  Blanca Macdonald, RN Registered Nurse Nurse     10/30/17 -  Mirela Galvan, PT Physical Therapist PT                    PT Recommendation and Plan  Anticipated Discharge Disposition: inpatient rehabilitation facility  PT Frequency: daily  Plan of Care Review  Plan Of Care Reviewed With: patient, spouse  Outcome Summary/Follow up Plan: pt able to amb in [place] today with RW and CG, he used a cane prior and will benefit from ongoing therapy to continue progressing back to safer/independent function.  Recommend HH at disch          Outcome Measures       11/12/17 1350 11/10/17 1036 11/10/17 1020    How much help from another person do you currently need...    Turning from your back to your side while in flat bed without using bedrails? 4  -MM 3  -MJ     Moving from lying on back to sitting on the side of a flat bed without bedrails? 4  -MM 3  -MJ     Moving to and from a bed to a chair (including a wheelchair)? 4  -MM 3  -MJ     Standing up from a chair using your arms (e.g., wheelchair, bedside chair)? 4  -MM 3  -MJ     Climbing 3-5 steps with a railing? 3  -MM 3  -MJ     To walk in hospital room? 3  -MM 3  -MJ     AM-PAC 6 Clicks Score 22  -MM 18  -MJ     How much help from another is currently needed...    Putting on and taking off regular lower body clothing?   2  -CL    Bathing (including washing,  rinsing, and drying)   2  -CL    Toileting (which includes using toilet bed pan or urinal)   2  -CL    Putting on and taking off regular upper body clothing   3  -CL    Taking care of personal grooming (such as brushing teeth)   3  -CL    Eating meals   3  -CL    Score   15  -CL    Modified [place] Scale    Modified [place] Scale  3 - Moderate disability.  Requiring some help, but able to walk without assistance.  -MJ 3 - Moderate disability.  Requiring some help, but able to walk without assistance.  -CL    Functional Assessment    Outcome Measure Options AM-PAC 6 Clicks Basic Mobility (PT)  -MM AM-PAC 6 Clicks Basic Mobility (PT);Modified [place]  -MJ AM-PAC 6 Clicks Daily Activity (OT)  -CL      User Key  (r) = Recorded By, (t) = Taken By, (c) = Cosigned By    Initials Name Provider Type    EFRAÍN Anderson, PT Physical Therapist    CL Bernice Mann, OT Occupational Therapist    MJ Mirela Galvan, PT Physical Therapist           Time Calculation:         PT Charges       11/12/17 1431          Time Calculation    Start Time 1350  -MM      PT Received On 11/12/17  -MM      PT Goal Re-Cert Due Date 11/20/17  -MM      Time Calculation- PT    Total Timed Code Minutes- PT 20 minute(s)  -MM        User Key  (r) = Recorded By, (t) = Taken By, (c) = Cosigned By    Initials Name Provider Type    EFRAÍN Anderson, PT Physical Therapist          Therapy Charges for Today     Code Description Service Date Service Provider Modifiers Qty    [id number] HC GAIT TRAINING EA 15 MIN 11/12/2017 Ita Anderson, PT GP 1          PT G-Codes  Outcome Measure Options: AM-PAC 6 Clicks Basic Mobility (PT)    Ita Anderson, PT  11/12/2017

## 2017-11-12 NOTE — PLAN OF CARE
Problem: Stroke (Hemorrhagic) (Adult)  Intervention: Prevent/Manage DVT/VTE Risk    11/12/17 0403   Support Surgical/Anesthesia Recovery   Venous Thromboembolism Prevent/Manage bilateral;sequential compression devices on;compression stockings on;fluids promoted;anticoagulant therapy maintained;plantar flexion performed

## 2017-11-12 NOTE — PLAN OF CARE
Problem: Stroke (Hemorrhagic) (Adult)  Goal: Signs and Symptoms of Listed Potential Problems Will be Absent or Manageable (Stroke)  Outcome: Ongoing (interventions implemented as appropriate)    11/12/17 1604   Stroke (Hemorrhagic)   Problems Assessed (Stroke (Hemorrhagic)) all   Problems Present (Stroke (Hemorrhagic)) cognitive impairment;communication impairment         Problem: Fall Risk (Adult)  Goal: Identify Related Risk Factors and Signs and Symptoms  Outcome: Ongoing (interventions implemented as appropriate)    11/12/17 1604   Fall Risk   Fall Risk: Related Risk Factors confusion/agitation;gait/mobility problems;impaired vision;neuro disease/injury;sensory deficits;environment unfamiliar   Fall Risk: Signs and Symptoms presence of risk factors       Goal: Absence of Falls  Outcome: Ongoing (interventions implemented as appropriate)    11/12/17 1604   Fall Risk (Adult)   Absence of Falls making progress toward outcome

## 2017-11-12 NOTE — PLAN OF CARE
Problem: Patient Care Overview (Adult)  Goal: Plan of Care Review  Outcome: Ongoing (interventions implemented as appropriate)    Problem: Inpatient Physical Therapy  Goal: Bed Mobility Goal LTG- PT  Outcome: Ongoing (interventions implemented as appropriate)    11/10/17 1202 11/12/17 1427   Bed Mobility PT LTG   Bed Mobility PT LTG, Date Established 11/10/17 --    Bed Mobility PT LTG, Time to Achieve 2 wks --    Bed Mobility PT LTG, Activity Type supine to sit/sit to supine --    Bed Mobility PT LTG, [place] Level independent --    Bed Mobility PT Goal LTG, Assist Device bed rails --    Bed Mobility PT LTG, Outcome --  goal ongoing       Goal: Transfer Training Goal 1 LTG- PT  Outcome: Ongoing (interventions implemented as appropriate)    11/10/17 1202 11/12/17 1427   Transfer Training PT LTG   Transfer Training PT LTG, Date Established 11/10/17 --    Transfer Training PT LTG, Time to Achieve 2 wks --    Transfer Training PT LTG, Activity Type bed to chair /chair to bed --    Transfer Training PT LTG, [place] Level supervision required --    Transfer Training PT LTG, Assist Device walker, rolling --    Transfer Training PT LTG, Outcome --  goal ongoing       Goal: Gait Training Goal LTG- PT  Outcome: Ongoing (interventions implemented as appropriate)    11/10/17 1202 11/12/17 1427   Gait Training PT LTG   Gait Training Goal PT LTG, Date Established 11/10/17 --    Gait Training Goal PT LTG, Time to Achieve 2 wks --    Gait Training Goal PT LTG, [place] Level supervision required --    Gait Training Goal PT LTG, Assist Device walker, rolling --    Gait Training Goal PT LTG, Distance to Achieve 300' --    Gait Training Goal PT LTG, Outcome --  goal ongoing

## 2017-11-13 ENCOUNTER — APPOINTMENT (OUTPATIENT)
Dept: GENERAL RADIOLOGY | Facility: HOSPITAL | Age: 82
End: 2017-11-13

## 2017-11-13 LAB
ALBUMIN SERPL-MCNC: 4.4 G/DL (ref 3.2–4.8)
ALBUMIN/GLOB SERPL: 1.8 G/DL (ref 1.5–2.5)
ALP SERPL-CCNC: 62 U/L (ref 25–100)
ALT SERPL W P-5'-P-CCNC: 17 U/L (ref 7–40)
ANION GAP SERPL CALCULATED.3IONS-SCNC: 8 MMOL/L (ref 3–11)
AST SERPL-CCNC: 24 U/L (ref 0–33)
BASOPHILS # BLD AUTO: 0.04 10*3/MM3 (ref 0–0.2)
BASOPHILS NFR BLD AUTO: 0.4 % (ref 0–1)
BILIRUB SERPL-MCNC: 2.6 MG/DL (ref 0.3–1.2)
BUN BLD-MCNC: 26 MG/DL (ref 9–23)
BUN/CREAT SERPL: 26 (ref 7–25)
CALCIUM SPEC-SCNC: 9.1 MG/DL (ref 8.7–10.4)
CHLORIDE SERPL-SCNC: 106 MMOL/L (ref 99–109)
CO2 SERPL-SCNC: 26 MMOL/L (ref 20–31)
CREAT BLD-MCNC: 1 MG/DL (ref 0.6–1.3)
DEPRECATED RDW RBC AUTO: 80 FL (ref 37–54)
EOSINOPHIL # BLD AUTO: 0.16 10*3/MM3 (ref 0–0.3)
EOSINOPHIL NFR BLD AUTO: 1.5 % (ref 0–3)
ERYTHROCYTE [DISTWIDTH] IN BLOOD BY AUTOMATED COUNT: 22.8 % (ref 11.3–14.5)
GFR SERPL CREATININE-BSD FRML MDRD: 71 ML/MIN/1.73
GLOBULIN UR ELPH-MCNC: 2.5 GM/DL
GLUCOSE BLD-MCNC: 87 MG/DL (ref 70–100)
HCT VFR BLD AUTO: 36.9 % (ref 38.9–50.9)
HGB BLD-MCNC: 12.2 G/DL (ref 13.1–17.5)
IMM GRANULOCYTES # BLD: 0.03 10*3/MM3 (ref 0–0.03)
IMM GRANULOCYTES NFR BLD: 0.3 % (ref 0–0.6)
LYMPHOCYTES # BLD AUTO: 1.84 10*3/MM3 (ref 0.6–4.8)
LYMPHOCYTES NFR BLD AUTO: 17.7 % (ref 24–44)
MCH RBC QN AUTO: 32 PG (ref 27–31)
MCHC RBC AUTO-ENTMCNC: 33.1 G/DL (ref 32–36)
MCV RBC AUTO: 96.9 FL (ref 80–99)
MONOCYTES # BLD AUTO: 1.48 10*3/MM3 (ref 0–1)
MONOCYTES NFR BLD AUTO: 14.3 % (ref 0–12)
NEUTROPHILS # BLD AUTO: 6.82 10*3/MM3 (ref 1.5–8.3)
NEUTROPHILS NFR BLD AUTO: 65.8 % (ref 41–71)
PLATELET # BLD AUTO: 228 10*3/MM3 (ref 150–450)
PMV BLD AUTO: 10.7 FL (ref 6–12)
POTASSIUM BLD-SCNC: 3.7 MMOL/L (ref 3.5–5.5)
PROT SERPL-MCNC: 6.9 G/DL (ref 5.7–8.2)
RBC # BLD AUTO: 3.81 10*6/MM3 (ref 4.2–5.76)
SODIUM BLD-SCNC: 140 MMOL/L (ref 132–146)
WBC NRBC COR # BLD: 10.37 10*3/MM3 (ref 3.5–10.8)

## 2017-11-13 PROCEDURE — 94640 AIRWAY INHALATION TREATMENT: CPT

## 2017-11-13 PROCEDURE — 92507 TX SP LANG VOICE COMM INDIV: CPT

## 2017-11-13 PROCEDURE — 97530 THERAPEUTIC ACTIVITIES: CPT | Performed by: OCCUPATIONAL THERAPIST

## 2017-11-13 PROCEDURE — 99232 SBSQ HOSP IP/OBS MODERATE 35: CPT | Performed by: INTERNAL MEDICINE

## 2017-11-13 PROCEDURE — 94799 UNLISTED PULMONARY SVC/PX: CPT

## 2017-11-13 PROCEDURE — 85025 COMPLETE CBC W/AUTO DIFF WBC: CPT | Performed by: NURSE PRACTITIONER

## 2017-11-13 PROCEDURE — 71010 HC CHEST PA OR AP: CPT

## 2017-11-13 PROCEDURE — 80053 COMPREHEN METABOLIC PANEL: CPT | Performed by: NURSE PRACTITIONER

## 2017-11-13 RX ADMIN — IPRATROPIUM BROMIDE AND ALBUTEROL SULFATE 3 ML: .5; 3 SOLUTION RESPIRATORY (INHALATION) at 08:52

## 2017-11-13 RX ADMIN — PANTOPRAZOLE SODIUM 40 MG: 40 TABLET, DELAYED RELEASE ORAL at 05:19

## 2017-11-13 RX ADMIN — ATENOLOL 25 MG: 25 TABLET ORAL at 08:43

## 2017-11-13 RX ADMIN — IPRATROPIUM BROMIDE AND ALBUTEROL SULFATE 3 ML: .5; 3 SOLUTION RESPIRATORY (INHALATION) at 14:04

## 2017-11-13 RX ADMIN — ACETAMINOPHEN 650 MG: 325 TABLET ORAL at 06:16

## 2017-11-13 RX ADMIN — IPRATROPIUM BROMIDE AND ALBUTEROL SULFATE 3 ML: .5; 3 SOLUTION RESPIRATORY (INHALATION) at 20:02

## 2017-11-13 NOTE — PLAN OF CARE
Problem: Stroke (Hemorrhagic) (Adult)  Intervention: Provide Oxygenation/Ventilation/Perfusion Support    11/13/17 0256   Respiratory Interventions   Airway/Ventilation Management airway patency maintained   Positioning   Head Of Bed (HOB) Position HOB at 30-45 degrees   Activity   Activity Type activity adjusted per tolerance;ROM, active encouraged;sitting, edge of bed

## 2017-11-13 NOTE — THERAPY TREATMENT NOTE
Acute Care - Occupational Therapy Treatment Note  [facility]     Patient Name: Vel James  : 1934  MRN: [id number]  Today's Date: 2017  Onset of Illness/Injury or Date of Surgery Date: 17  Date of Referral to OT: 17  Referring Physician: MD Fabricio      Admit Date: 2017    Visit Dx:     ICD-10-CM ICD-9-CM   1. Impaired functional mobility, balance, gait, and endurance Z74.09 V49.89   2. Impaired mobility and ADLs Z74.09 799.89   3. 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 315.32     Patient Active Problem List   Diagnosis   • ICH (intracerebral hemorrhage), left occipital   • Hypertension   • History of MI (myocardial infarction)   • BPH (benign prostatic hyperplasia)             Adult Rehabilitation Note       17 1520 17 1500 17 1350    Rehab Assessment/Intervention    Discipline occupational therapist  -AR speech language pathologist  -SM physical therapist  -MM    Document Type therapy note (daily note)  -AR therapy note (daily note)   + tx prep prior to session  -SM therapy note (daily note)  -MM    Subjective Information no complaints;agree to therapy  -AR no complaints  -SM no complaints;agree to therapy  -MM    Patient Effort, Rehab Treatment good  -AR good  -SM good  -MM    Symptoms Noted During/After Treatment none  -AR  none  -MM    Precautions/Limitations fall precautions;other (see comments)   monitor vitals, visiual deficit  -AR  fall precautions  -MM    Precautions/Limitations, Vision   WFL  -MM    Precautions/Limitations, Hearing   WFL  -MM    Patient Response to Treatment   good  -MM    Recorded by [AR] Celi Cha, OT [SM] Veronique Carlos, MS CCC-SLP [MM] Ita Anderson, PT    Vital Signs    Pre Systolic BP Rehab 143  -AR  127  -MM    Pre Treatment Diastolic BP 95  -AR  66  -MM    Post Systolic BP Rehab 160  -AR      Post Treatment Diastolic BP 92  -AR      Pretreatment Heart Rate (beats/min)   66  -MM    O2 Delivery Pre Treatment   " room air  -MM    Pre Patient Position Sitting  -AR  Sitting  -MM    Intra Patient Position Standing  -AR  Standing  -MM    Post Patient Position Sitting  -AR  Sitting  -MM    Recorded by [AR] Celi Cha OT  [MM] Ita Anderson, PT    Pain Assessment    Pain Assessment 0-10  -AR No/denies pain  -SM 0-10  -MM    Pain Score 1  -AR  2  -MM    Post Pain Score 1  -AR  2  -MM    Pain Type Acute pain  -AR  Acute pain  -MM    Pain Location Head  -AR  Head  -MM    Pain Intervention(s) Repositioned;Ambulation/increased activity  -AR      Response to Interventions tolerated  -AR      Recorded by [AR] Celi Cha OT [SM] Veronique Carlos, MS CCC-SLP [MM] Ita Anderson, PT    Vision Assessment/Intervention    Visual Impairment visual impairment, bilaterally;diplopia;other (see comments)   Able patrially complete QSVT,\"images move\"  -AR      Vision Comment --   impaired depth perception, intermittent diplopia L eye  -AR      Recorded by [AR] Celi Cha OT      Cognitive Assessment/Intervention    Current Cognitive/Communication Assessment functional  -AR  functional  -MM    Orientation Status oriented x 4  -AR  oriented x 4  -MM    Follows Commands/Answers Questions 100% of the time;able to follow multi-step instructions  -AR  100% of the time  -MM    Personal Safety WNL/WFL  -AR  WNL/WFL  -MM    Personal Safety Interventions gait belt;nonskid shoes/slippers when out of bed;supervised activity  -AR  fall prevention program maintained;gait belt;nonskid shoes/slippers when out of bed  -MM    Recorded by [AR] Celi Cha OT  [MM] Ita Anderson, PT    Improve word retrieval skills    Improve word retrieval skills by:  completing functional word finding tasks;80%;with inconsistent cues  -SM     Status: Improve word retrieval skills  Achieved  -SM     Word Retrieval Skills Progress  100%;without cues  -SM     Recorded by  [SM] Veronique Carlos, MS CCC-SLP     Bed Mobility, " Assessment/Treatment    Bed Mobility, Comment   UIC  -MM    Recorded by   [MM] Ita Anderson, PT    Transfer Assessment/Treatment    Transfers, Sit-Stand [place] supervision required  -AR  stand by assist  -MM    Transfers, Stand-Sit [place] supervision required  -AR  stand by assist  -MM    Transfers, Sit-Stand-Sit, Assist Device rolling walker  -AR      Recorded by [AR] Celi Cha, OT  [MM] Ita Anderson, PT    Gait Assessment/Treatment    Gait, [place] Level   contact guard assist  -MM    Gait, Assistive Device   rolling walker  -MM    Gait, Distance (Feet)   200  -MM    Gait, Gait Pattern Analysis   swing-through gait  -MM    Gait, Gait Deviations   forward flexed posture  -MM    Gait, Impairments   impaired balance;strength decreased  -MM    Gait, Comment   would benefit from trunk work to improve strength and posture  -MM    Recorded by   [MM] Ita Anderson, PT    Functional Mobility    Functional Mobility- Ind. Level supervision required  -AR      Functional Mobility- Device rolling walker  -AR      Functional Mobility-Distance (Feet) 20  -AR      Recorded by [AR] Celi Cha OT      Lower Body Dressing Assessment/Training    LB Dressing Assess/Train, Clothing Type donning:;slipper socks  -AR      LB Dressing Assess/Train, Position sitting  -AR      LB Dressing Assess/Train, [place] supervision required  -AR      Recorded by [AR] Celi Cha OT      Balance Skills Training    Sitting-Level of Assistance Independent  -AR  Distant supervision  -MM    Sitting-Balance Support Feet supported  -AR  Feet supported  -MM    Standing-Level of Assistance Distant supervision  -AR  Close supervision  -MM    Static Standing Balance Support assistive device  -AR  assistive device  -MM    Recorded by [AR] Celi Cha, OT  [MM] Ita Anderson, PT    Positioning and Restraints    Pre-Treatment Position sitting in chair/recliner  -AR  sitting in chair/recliner   -MM    Post Treatment Position chair  -AR  chair  -MM    In Chair sitting;notified nsg;call light within reach;encouraged to call for assist  -AR  sitting;call light within reach;encouraged to call for assist;exit alarm on;with family/caregiver  -MM    Recorded by [AR] Celi Cah, OT  [MM] Ita Anderson, PT      11/11/17 1100          Rehab Assessment/Intervention    Discipline speech language pathologist  -LS      Document Type therapy note (daily note)  -LS      Recorded by [LS] Martine Perez, MS CCC-SLP      Cognitive Assessment/Intervention    Additional Documentation Cognitive Assessment Intervention (Group)  -LS      Recorded by [LS] Martine Perez, MS CCC-SLP      Cognitive Assessment Intervention    Behavior/Mood Observations alert;behavior appropriate to situation, WNL/WFL  -LS      Attention --  -LS      Attending Skill Interventions Fuctional for tasks fo his choosing. Difficulty to engage pt in further assessment tasks  -LS      Organization WNL/WFL  -LS      Recorded by [LS] Martine Perez, MS EMILIO-SLP      Communication Assessment/Intervention    Additional Documentation Auditory Comprehen Assess/Intervention (Group);Verbal Expression Assess/Intervention (Group);Motor Speech Assessment/Intervention (Group)  -LS      Recorded by [LS] Martine Perez, MS CCC-SLP      Auditory Comprehen Assess/Intervention    Auditory Comprehension Comment Suspect functional. Patient dominated conversation but answered appropriatly when SLP inquired on various topics.   -LS      Recorded by [LS] Martine Perez MS CCC-SLP      Improve word retrieval skills    Improve word retrieval skills by: completing functional word finding tasks;80%;with inconsistent cues  -LS      Status: Improve word retrieval skills Progressing as expected  -LS      Word Retrieval Skills Progress 60%;without cues  -LS      Comments: Improve word retrieval skills Difficulty with word finding noted in conversation but pt was often  "able to talk around word. Pt presented with difficulty in structured naming activites but appeared to be approving. It appeared he would avoid tasks he thought were challenging and stated \"he wanted to work it out himself\"  -LS      Recorded by [LS] Martine Perez, MS CCC-SLP        User Key  (r) = Recorded By, (t) = Taken By, (c) = Cosigned By    Initials Name Effective Dates    MM Ita ARCE Justin, PT 06/19/15 -     SM Veronique Carlos, MS CCC-SLP 06/22/15 -     AR Celi Cha, OT 06/22/15 -     LS Martine Perez, MS CCC-SLP 06/22/15 -                 OT Goals       11/13/17 1608 11/10/17 1414       Transfer Training OT LTG    Transfer Training OT LTG, Date Established  11/10/17  -CL     Transfer Training OT LTG, Time to Achieve  by discharge  -CL     Transfer Training OT LTG, Activity Type  toilet;sit to stand/stand to sit  -CL     Transfer Training OT LTG, [place] Level  contact guard assist  -CL     Transfer Training OT LTG, Additional Goal  AAD  -CL     Transfer Training OT LTG, Outcome goal partially met  -AR      Patient Education OT LTG    Patient Education OT LTG, Date Established  11/10/17  -CL     Patient Education OT LTG, Time to Achieve  by discharge  -CL     Patient Education OT LTG, Education Type  written program;HEP;positioning;posture/body mechanics;home safety;adaptive equipment mgmt;energy conservation  -CL     Patient Education OT LTG, Education Understanding  verbalizes understanding  -CL     Patient Education OT LTG Outcome goal ongoing  -AR      LB Dressing OT LTG    LB Dressing Goal OT LTG, Date Established  11/10/17  -CL     LB Dressing Goal OT LTG, Time to Achieve  by discharge  -CL     LB Dressing Goal OT LTG, Activity Type  Don/doff socks  -CL     LB Dressing Goal OT LTG, [place] Level  supervision required  -CL     LB Dressing Goal OT LTG, Additional Goal  AAD  -CL     LB Dressing Goal OT LTG, Outcome goal met  -AR      Activity Tolerance OT LTG    Activity " Tolerance Goal OT LTG, Date Established  11/10/17  -CL     Activity Tolerance Goal OT LTG, Time to Achieve  by discharge  -CL     Activity Tolerance Goal OT LTG, Activity Level  10 min activity  -CL     Activity Tolerance Goal OT LTG, Additional Goal  1 seated rest break, VSS  -CL     Activity Tolerance Goal OT LTG, Outcome goal met  -AR        User Key  (r) = Recorded By, (t) = Taken By, (c) = Cosigned By    Initials Name Provider Type    AR Celi Cha, OT Occupational Therapist    CL Bernice Mann, OT Occupational Therapist          Occupational Therapy Education     Title: PT OT SLP Therapies (Done)     Topic: Occupational Therapy (Done)     Point: ADL training (Done)    Description: Instruct learner(s) on proper safety adaptation and remediation techniques during self care or transfers.   Instruct in proper use of assistive devices.    Learning Progress Summary    Learner Readiness Method Response Comment Documented by Status   Patient Eager INGRIDTB,D NR,VU Reviewed ADL retraining, tranafer training, home safety AR 11/13/17 1608 Done    Acceptance E,D NR Pt educated on appropriate safety precautions, t/f techniques, and role of therapy. CL 11/10/17 1413 Active               Point: Home exercise program (Done)    Description: Instruct learner(s) on appropriate technique for monitoring, assisting and/or progressing therapeutic exercises/activities.    Learning Progress Summary    Learner Readiness Method Response Comment Documented by Status   Patient Eager INGRID,TB,D NR,VU Reviewed ADL retraining, tranafer training, home safety AR 11/13/17 1608 Done               Point: Precautions (Done)    Description: Instruct learner(s) on prescribed precautions during self-care and functional transfers.    Learning Progress Summary    Learner Readiness Method Response Comment Documented by Status   Patient Eager E,TB,D NR,VU Reviewed ADL retraining, tranafer training, home safety AR 11/13/17 1608 Done    Acceptance E,D NR Pt  educated on appropriate safety precautions, t/f techniques, and role of therapy.  11/10/17 1413 Active               Point: Body mechanics (Done)    Description: Instruct learner(s) on proper positioning and spine alignment during self-care, functional mobility activities and/or exercises.    Learning Progress Summary    Learner Readiness Method Response Comment Documented by Status   Patient Eager E,TB,D NR,VU Reviewed ADL retraining, tranafer training, home safety AR 11/13/17 1608 Done    Acceptance E,D NR Pt educated on appropriate safety precautions, t/f techniques, and role of therapy.  11/10/17 1413 Active                      User Key     Initials Effective Dates Name Provider Type Discipline    AR 06/22/15 -  Celi Cha, OT Occupational Therapist OT    CL 06/08/16 -  Bernice Mann, OT Occupational Therapist OT                  OT Recommendation and Plan  Anticipated Equipment Needs At Discharge:  (TBA further)  Anticipated Discharge Disposition: inpatient rehabilitation facility  Therapy Frequency: daily  Plan of Care Review  Plan Of Care Reviewed With: patient  Progress: improving  Outcome Summary/Follow up Plan: Pt achieved ADL goal and partially achieved transfer goal. Pt particiated in Quick Screen Visual Test, he reports intermittent diplopia L eye, impaired depth perception and stationary items moving in his visual field. Recommend DC home with HHOT and RW. Pt declined need for BSC.          Outcome Measures       11/13/17 1520 11/12/17 1350       How much help from another person do you currently need...    Turning from your back to your side while in flat bed without using bedrails?  4  -MM     Moving from lying on back to sitting on the side of a flat bed without bedrails?  4  -MM     Moving to and from a bed to a chair (including a wheelchair)?  4  -MM     Standing up from a chair using your arms (e.g., wheelchair, bedside chair)?  4  -MM     Climbing 3-5 steps with a railing?  3  -MM      To walk in hospital room?  3  -MM     AM-PAC 6 Clicks Score  22  -MM     How much help from another is currently needed...    Putting on and taking off regular lower body clothing? 3  -AR      Bathing (including washing, rinsing, and drying) 3  -AR      Toileting (which includes using toilet bed pan or urinal) 3  -AR      Putting on and taking off regular upper body clothing 3  -AR      Taking care of personal grooming (such as brushing teeth) 4  -AR      Eating meals 4  -AR      Score 20  -AR      Modified Jeannette Scale    Modified Fide Scale 2 - Slight disability.  Unable to carry out all previous activities but able to look after own affairs without assistance.  -AR      Functional Assessment    Outcome Measure Options AM-PAC 6 Clicks Daily Activity (OT)  -AR AM-PAC 6 Clicks Basic Mobility (PT)  -MM       User Key  (r) = Recorded By, (t) = Taken By, (c) = Cosigned By    Initials Name Provider Type    MM Ita Anderson, PT Physical Therapist    AR Celi Cha OT Occupational Therapist           Time Calculation:         Time Calculation- OT       11/13/17 1613          Time Calculation- OT    OT Start Time 1520  -AR      Total Timed Code Minutes- OT 33 minute(s)  -AR      OT Received On 11/13/17  -AR      OT Goal Re-Cert Due Date 11/20/17  -AR        User Key  (r) = Recorded By, (t) = Taken By, (c) = Cosigned By    Initials Name Provider Type    AR Celi Cha OT Occupational Therapist           Therapy Charges for Today     Code Description Service Date Service Provider Modifiers Qty    [id number]  OT THERAPEUTIC ACT EA 15 MIN 11/13/2017 Celi Cha OT GO 2               Celi Cha OT  11/13/2017

## 2017-11-13 NOTE — PLAN OF CARE
Problem: Patient Care Overview (Adult)  Goal: Plan of Care Review  Outcome: Ongoing (interventions implemented as appropriate)    11/13/17 1608   Coping/Psychosocial Response Interventions   Plan Of Care Reviewed With patient   Patient Care Overview   Progress improving   Outcome Evaluation   Outcome Summary/Follow up Plan Pt achieved ADL goal and partially achieved transfer goal. Pt particiated in Quick Screen Visual Test, he reports intermittent diplopia L eye, impaired depth perception and stationary items moving in his visual field. Recommend DC home with HHOT and RW. Pt declined need for BSC.          Problem: Stroke (Hemorrhagic) (Adult)  Goal: Signs and Symptoms of Listed Potential Problems Will be Absent or Manageable (Stroke)  Outcome: Ongoing (interventions implemented as appropriate)    11/13/17 1608   Stroke (Hemorrhagic)   Problems Assessed (Stroke (Hemorrhagic)) motor/sensory impairment   Problems Present (Stroke (Hemorrhagic)) motor/sensory impairment         Problem: Inpatient Occupational Therapy  Goal: Transfer Training Goal 1 LTG- OT  Outcome: Ongoing (interventions implemented as appropriate)    11/10/17 1414 11/13/17 1608   Transfer Training OT LTG   Transfer Training OT LTG, Date Established 11/10/17 --    Transfer Training OT LTG, Time to Achieve by discharge --    Transfer Training OT LTG, Activity Type toilet;sit to stand/stand to sit --    Transfer Training OT LTG, [place] Level contact guard assist --    Transfer Training OT LTG, Additional Goal AAD --    Transfer Training OT LTG, Outcome --  goal partially met       Goal: Patient Education Goal LTG- OT  Outcome: Ongoing (interventions implemented as appropriate)    11/10/17 1414 11/13/17 1608   Patient Education OT LTG   Patient Education OT LTG, Date Established 11/10/17 --    Patient Education OT LTG, Time to Achieve by discharge --    Patient Education OT LTG, Education Type written program;HEP;positioning;posture/body  mechanics;home safety;adaptive equipment mgmt;energy conservation --    Patient Education OT LTG, Education Understanding verbalizes understanding --    Patient Education OT LTG Outcome --  goal ongoing       Goal: LB Dressing Goal LTG- OT  Outcome: Outcome(s) achieved Date Met:  11/13/17    11/10/17 1414 11/13/17 1608   LB Dressing OT LTG   LB Dressing Goal OT LTG, Date Established 11/10/17 --    LB Dressing Goal OT LTG, Time to Achieve by discharge --    LB Dressing Goal OT LTG, Activity Type Don/doff socks --    LB Dressing Goal OT LTG, [place] Level supervision required --    LB Dressing Goal OT LTG, Additional Goal AAD --    LB Dressing Goal OT LTG, Outcome --  goal met       Goal: Activity Tolerance Goal LTG- OT  Outcome: Outcome(s) achieved Date Met:  11/13/17    11/10/17 1414 11/13/17 1608   Activity Tolerance OT LTG   Activity Tolerance Goal OT LTG, Date Established 11/10/17 --    Activity Tolerance Goal OT LTG, Time to Achieve by discharge --    Activity Tolerance Goal OT LTG, Activity Level 10 min activity --    Activity Tolerance Goal OT LTG, Additional Goal 1 seated rest break, VSS --    Activity Tolerance Goal OT LTG, Outcome --  goal met

## 2017-11-13 NOTE — PROGRESS NOTES
[facility] Medicine Services  PROGRESS NOTE    Patient Name: Vel James  : 1934  MRN: [id number]    Date of Admission: 2017  Length of Stay: 4  Primary Care Physician: Kishan Ceballos MD    Subjective   Subjective     CC:  F/u ICH    HPI:  Still having some left sided headache, no visual changes worsened from baseline. No dizziness/lightheadedness. BP elevated this AM, but improved with pain medication.    Review of Systems  Gen- No fevers, chills  CV- No chest pain, palpitations  Resp- No cough, dyspnea  GI- No N/V/D, abd pain    Otherwise ROS is negative except as mentioned in the HPI.    Objective   Objective     Vital Signs:   Temp:  [98 °F (36.7 °C)-98.3 °F (36.8 °C)] 98.3 °F (36.8 °C)  Heart Rate:  [60-68] 65  Resp:  [16-18] 18  BP: (125-150)/(65-97) 133/65  Total (NIH Stroke Scale): 2     Physical Exam:  Constitutional: No acute distress, awake, alert  Eyes: PERRLA, sclerae anicteric, no conjunctival injection  HENT: NCAT, mucous membranes moist  Neck: Supple, no thyromegaly  Respiratory: Clear to auscultation bilaterally, nonlabored respirations   Cardiovascular: RRR, no murmurs, rubs, or gallops, palpable pedal pulses bilaterally  Gastrointestinal: Positive bowel sounds, soft, nontender, nondistended  Musculoskeletal: No bilateral ankle edema  Psychiatric: Appropriate affect, cooperative  Neurologic: Oriented x 3, strength symmetric in all extremities, Cranial Nerves grossly intact to confrontation, speech clear  Skin: No rashes    Results Reviewed:  I have personally reviewed current lab, radiology, and data and agree.      Results from last 7 days  Lab Units 17  0517  0610 17  0401   WBC 10*3/mm3 10.37 12.03* 11.75*   HEMOGLOBIN g/dL 12.2* 11.9* 12.4*   HEMATOCRIT % 36.9* 34.8* 37.9*   PLATELETS 10*3/mm3 228 199 210       Results from last 7 days  Lab Units 17  0526 17  0610 17  0401   SODIUM mmol/L 140 141 140   POTASSIUM  mmol/L 3.7 3.9 3.8   CHLORIDE mmol/L 106 108 108   CO2 mmol/L 26.0 27.0 26.0   BUN mg/dL 26* 22 19   CREATININE mg/dL 1.00 0.90 0.90   GLUCOSE mg/dL 87 91 100   CALCIUM mg/dL 9.1 8.8 9.3   ALT (SGPT) U/L 17 14  --    AST (SGOT) U/L 24 24  --      No results found for: BNP  No results found for: PHART    Microbiology Results Abnormal     None          Imaging Results (last 24 hours)     Procedure Component Value Units Date/Time    XR Chest 1 View [[id number]] Collected:  11/13/17 0853     Updated:  11/13/17 0903    Narrative:       EXAMINATION: XR CHEST 1 VW-      INDICATION: Hypertension; Z74.09-Other reduced mobility;  R41.841-Cognitive communication deficit.      COMPARISON: None.     FINDINGS: Portable chest reveals underlying chronic and emphysematous  changes seen within the lung fields bilaterally. Degenerative change is  seen within the spine. Cardiac and mediastinal silhouettes are within  normal limits. No focal parenchymal opacification is present.  No  pleural effusion or pneumothorax.           Impression:       Chronic changes seen within the lung fields with no evidence  of acute parenchymal disease.     D:  11/13/2017  E:  11/13/2017     This report was finalized on 11/13/2017 9:01 AM by Dr. Irene Galvan MD.                I have reviewed the medications.    Assessment/Plan   Assessment / Plan     Hospital Problem List     * (Principal)ICH (intracerebral hemorrhage), left occipital    Hypertension    History of MI (myocardial infarction)    BPH (benign prostatic hyperplasia)             Brief Hospital Course to date:  Vel James is a 83 y.o. male who presented to [facility] with complaints of headache, incoordination and headache with associated visual field defitis. He was found to have a small left occipital lobe intraparenchymal hemorrhage. He was transferred to [facility] on 11/9 for further care. He was initially managed in the ICU on Cardene with goal SBP <140. Neurosurgery was  consulted who planned for no further surgical intervention. He was transferred to the medical floor on 11/13.     Assessment & Plan:  1. Left occipital ICH:  - 1.6x3.3cm on CT  - currently having no further visual field changes   - NSG with no plans for surgical intervention  - continue with strict BP control  - neuro checks  - Tylenol PRN for headache  - PT/OT    2. HTN:  - Atenolol    3. GERD:  - Pantoprozole    DVT Prophylaxis:  mechanical    CODE STATUS: Full Code    Disposition: I expect the patient to be discharged home vs. Rehab 1-2 days    Lamar Barboza DO  11/13/17  11:10 AM

## 2017-11-13 NOTE — PROGRESS NOTES
"Adult Nutrition  Assessment/PES    Patient Name:  Vel James  YOB: 1934  MRN: [id number]  Admit Date:  11/9/2017    Assessment Date:  11/13/2017        Reason for Assessment       11/13/17 1054    Reason for Assessment    Reason For Assessment/Visit follow up protocol    Time Spent (min) 20    Diagnosis Diagnosis   Per notes this admission      Anthropometrics       11/13/17 1055    Anthropometrics    Height 170.2 cm (67\")    Weight 102 kg (224 lb 13.9 oz)    Ideal Body Weight (IBW)    Ideal Body Weight (IBW), Male (kg) 68.1    % Ideal Body Weight 150.1    Body Mass Index (BMI)    BMI (kg/m2) 35.29            Labs/Tests/Procedures/Meds       11/13/17 1055    Labs/Tests/Procedures/Meds    Labs/Tests Review Reviewed     Nutrition Prescription Ordered       11/13/17 1055    Nutrition Prescription PO    Current PO Diet Regular    Common Modifiers Cardiac            Evaluation of Received Nutrient/Fluid Intake       11/13/17 1055    PO Evaluation    Number of Meals 5    % PO Intake 80        Problem/Interventions:        Problem 1       11/13/17 1056    Nutrition Diagnoses Problem 1    Problem 1 No Nutrition Diagnosis at this Time    Signs/Symptoms (evidenced by) PO Intake    Percent (%) intake recorded 80 %    Over number of meals 5        Nutrition Intervention       11/13/17 1057    Nutrition Intervention    RD/Tech Action Follow Tx progress;Care plan reviewd     Education/Evaluation       11/13/17 1057    Monitor/Evaluation    Monitor Per protocol;PO intake        Electronically signed by:  Clara Salinas  11/13/17 10:58 AM  "

## 2017-11-13 NOTE — PLAN OF CARE
Problem: Patient Care Overview (Adult)  Goal: Plan of Care Review  Outcome: Ongoing (interventions implemented as appropriate)    11/13/17 1555   Coping/Psychosocial Response Interventions   Plan Of Care Reviewed With patient;spouse   Outcome Evaluation   Outcome Summary/Follow up Plan Participated well in s/l tx. Expressive aphasia has resolved. Met all goals. Dx tx higher-level function - no difficulties. Could not re-assess reading/writing as did not have reading glasses. Feels comfortable back to baseline, wife in agreement. No further SLP needs.          Problem: Stroke (Hemorrhagic) (Adult)  Goal: Signs and Symptoms of Listed Potential Problems Will be Absent or Manageable (Stroke)    11/13/17 1555   Stroke (Hemorrhagic)   Problems Assessed (Stroke (Hemorrhagic)) communication impairment   Problems Present (Stroke (Hemorrhagic)) none         Problem: Inpatient SLP  Goal: Expressive-Patient will improve expressive language skills to allow optimal participation in care  Outcome: Ongoing (interventions implemented as appropriate)    11/11/17 1210 11/13/17 1555   Expressive- Optimal Participation in Care   Expressive Optimal Participation in Care- SLP, Date Established 11/11/17 --    Expressive Optimal Participation in Care- SLP, Time to Achieve by discharge --    Expressive Optimal Participation in Care- SLP, Date Goal Reviewed 11/11/17 --    Expressive Optimal Participation in Care- SLP, Outcome --  goal met

## 2017-11-13 NOTE — THERAPY DISCHARGE NOTE
Acute Care - Speech Language Pathology /Discharge  [facility]     Patient Name: Vel James  : 1934  MRN: [id number]  Today's Date: 2017         Admit Date: 2017    Visit Dx:     ICD-10-CM ICD-9-CM   1. Impaired functional mobility, balance, gait, and endurance Z74.09 V49.89   2. Impaired mobility and ADLs Z74.09 799.89   3. 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 315.32     Patient Active Problem List   Diagnosis   • ICH (intracerebral hemorrhage), left occipital   • Hypertension   • History of MI (myocardial infarction)   • BPH (benign prostatic hyperplasia)              Adult Rehabilitation Note       17 1500 17 1350 17 1100    Rehab Assessment/Intervention    Discipline speech language pathologist  -SM physical therapist  -MM speech language pathologist  -LS    Document Type therapy note (daily note)   + tx prep prior to session  -SM therapy note (daily note)  -MM therapy note (daily note)  -LS    Subjective Information no complaints  -SM no complaints;agree to therapy  -MM     Patient Effort, Rehab Treatment good  -SM good  -MM     Symptoms Noted During/After Treatment  none  -MM     Precautions/Limitations  fall precautions  -MM     Precautions/Limitations, Vision  WFL  -MM     Precautions/Limitations, Hearing  WFL  -MM     Patient Response to Treatment  good  -MM     Recorded by [SM] Veronique Carlos, MS CCC-SLP [MM] Ita Anderson, PT [LS] Martine Perez, MS CCC-SLP    Vital Signs    Pre Systolic BP Rehab  127  -MM     Pre Treatment Diastolic BP  66  -MM     Pretreatment Heart Rate (beats/min)  66  -MM     O2 Delivery Pre Treatment  room air  -MM     Pre Patient Position  Sitting  -MM     Intra Patient Position  Standing  -MM     Post Patient Position  Sitting  -MM     Recorded by  [MM] Ita Andreson, PT     Pain Assessment    Pain Assessment No/denies pain  -SM 0-10  -MM     Pain Score  2  -MM     Post Pain Score  2  -MM     Pain Type  Acute pain  -MM      Pain Location  Head  -MM     Recorded by [SM] Veronique Carlos, MS CCC-SLP [MM] Ita Anderson, PT     Cognitive Assessment/Intervention    Current Cognitive/Communication Assessment  functional  -MM     Orientation Status  oriented x 4  -MM     Follows Commands/Answers Questions  100% of the time  -MM     Personal Safety  WNL/WFL  -MM     Personal Safety Interventions  fall prevention program maintained;gait belt;nonskid shoes/slippers when out of bed  -MM     Additional Documentation   Cognitive Assessment Intervention (Group)  -LS    Recorded by  [MM] Ita Anderson, PT [LS] Martine Perez, MS CCC-SLP    Cognitive Assessment Intervention    Behavior/Mood Observations   alert;behavior appropriate to situation, WNL/WFL  -LS    Attention   --  -LS    Attending Skill Interventions   Fuctional for tasks fo his choosing. Difficulty to engage pt in further assessment tasks  -LS    Organization   WNL/WFL  -LS    Recorded by   [LS] Martine Perez, MS EMILIO-SLP    Communication Assessment/Intervention    Additional Documentation   Auditory Comprehen Assess/Intervention (Group);Verbal Expression Assess/Intervention (Group);Motor Speech Assessment/Intervention (Group)  -LS    Recorded by   [LS] Martine Perez MS CCC-SLP    Auditory Comprehen Assess/Intervention    Auditory Comprehension Comment   Suspect functional. Patient dominated conversation but answered appropriatly when SLP inquired on various topics.   -LS    Recorded by   [LS] Martine Perez MS CCC-SLP    Improve word retrieval skills    Improve word retrieval skills by: completing functional word finding tasks;80%;with inconsistent cues  -SM  completing functional word finding tasks;80%;with inconsistent cues  -LS    Status: Improve word retrieval skills Achieved  -SM  Progressing as expected  -LS    Word Retrieval Skills Progress 100%;without cues  -SM  60%;without cues  -LS    Comments: Improve word retrieval skills   Difficulty with word finding  "noted in conversation but pt was often able to talk around word. Pt presented with difficulty in structured naming activites but appeared to be approving. It appeared he would avoid tasks he thought were challenging and stated \"he wanted to work it out himself\"  -LS    Recorded by [SM] Veronique Carlos, MS CCC-SLP  [LS] Martine Perez, MS CCC-SLP    Bed Mobility, Assessment/Treatment    Bed Mobility, Comment  UIC  -MM     Recorded by  [MM] Ita Anderson, PT     Transfer Assessment/Treatment    Transfers, Sit-Stand Elliott  stand by assist  -MM     Transfers, Stand-Sit Elliott  stand by assist  -MM     Recorded by  [MM] Ita Anderson, PT     Gait Assessment/Treatment    Gait, Elliott Level  contact guard assist  -MM     Gait, Assistive Device  rolling walker  -MM     Gait, Distance (Feet)  200  -MM     Gait, Gait Pattern Analysis  swing-through gait  -MM     Gait, Gait Deviations  forward flexed posture  -MM     Gait, Impairments  impaired balance;strength decreased  -MM     Gait, Comment  would benefit from trunk work to improve strength and posture  -MM     Recorded by  [MM] Ita Anderson, PT     Balance Skills Training    Sitting-Level of Assistance  Distant supervision  -MM     Sitting-Balance Support  Feet supported  -MM     Standing-Level of Assistance  Close supervision  -MM     Static Standing Balance Support  assistive device  -MM     Recorded by  [MM] Ita Anderson, PT     Positioning and Restraints    Pre-Treatment Position  sitting in chair/recliner  -MM     Post Treatment Position  chair  -MM     In Chair  sitting;call light within reach;encouraged to call for assist;exit alarm on;with family/caregiver  -MM     Recorded by  [MM] Ita Anderson, PT       User Key  (r) = Recorded By, (t) = Taken By, (c) = Cosigned By    Initials Name Effective Dates    MM Ita Anderson, PT 06/19/15 -     SM Veronique Carlos, MS CCC-SLP 06/22/15 -     LS Martine Perez, MS " CCC-SLP 06/22/15 -                SLP Goals       11/13/17 1555 11/11/17 1210 11/10/17 1541    Expressive- Optimal Participation in Care    Expressive Optimal Participation in Care- SLP, Date Established  11/11/17  -LS 11/10/17  -AC    Expressive Optimal Participation in Care- SLP, Time to Achieve  by discharge  - by discharge  -AC    Expressive Optimal Participation in Care- SLP, Date Goal Reviewed  11/11/17  -LS     Expressive Optimal Participation in Care- SLP, Outcome goal met  - goal ongoing  -       User Key  (r) = Recorded By, (t) = Taken By, (c) = Cosigned By    Initials Name Provider Type    HUSEYIN Carlos, MS CCC-SLP Speech and Language Pathologist    TINY Serrano, MS CCC-SLP Speech and Language Pathologist    KAT Perez, MS CCC-SLP Speech and Language Pathologist          EDUCATION  The patient has been educated in the following areas:   Communication Impairment.    SLP Recommendation and Plan                               Plan of Care Review  Plan Of Care Reviewed With: patient, spouse  Outcome Summary/Follow up Plan: Participated well in s/l tx. Expressive aphasia has resolved. Met all goals. Dx tx higher-level function - no difficulties. Could not re-assess reading/writing as did not have reading glasses. Feels comfortable back to baseline, wife in agreement. No further SLP needs.             Time Calculation:         Time Calculation- SLP       11/13/17 1557          Time Calculation- SLP    SLP Start Time 1500  -      SLP Received On 11/13/17  -        User Key  (r) = Recorded By, (t) = Taken By, (c) = Cosigned By    Initials Name Provider Type     Veronique Carlos MS CCC-SLP Speech and Language Pathologist          Therapy Charges for Today     Code Description Service Date Service Provider Modifiers Qty    [id number]  ST TREATMENT SPEECH 3 11/13/2017 Veronique Carlos, MS CCC-SLP GN 1                    Veronique Carlos MS CCC-SLP  11/13/2017

## 2017-11-13 NOTE — THERAPY EVALUATION
Acute Care - Occupational Therapy Initial Evaluation  [facility]     Patient Name: Vel James  : 1934  MRN: [id number]  Today's Date: 2017  Onset of Illness/Injury or Date of Surgery Date: 17  Date of Referral to OT: 17  Referring Physician: MD Fabricio    Admit Date: 2017       ICD-10-CM ICD-9-CM   1. Impaired functional mobility, balance, gait, and endurance Z74.09 V49.89   2. Impaired mobility and ADLs Z74.09 799.89   3. 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 315.32     Patient Active Problem List   Diagnosis   • ICH (intracerebral hemorrhage), left occipital   • Hypertension   • History of MI (myocardial infarction)   • BPH (benign prostatic hyperplasia)     Past Medical History:   Diagnosis Date   • Asthma, extrinsic    • BPH (benign prostatic hyperplasia)    • Coronary artery disease    • Hypertension    • Macular degeneration syndrome      Past Surgical History:   Procedure Laterality Date   • CARDIAC CATHETERIZATION            OT ASSESSMENT FLOWSHEET (last 72 hours)      OT Evaluation       17 1520 17 1500 17 1400 17 [id number] 17 0018    Rehab Evaluation    Document Type therapy note (daily note)  -AR therapy note (daily note)   + tx prep prior to session  -SM       Subjective Information no complaints;agree to therapy  -AR no complaints  -SM       Patient Effort, Rehab Treatment good  -AR good  -SM       Symptoms Noted During/After Treatment none  -AR        General Information    Precautions/Limitations fall precautions;other (see comments)   monitor vitals, visiual deficit  -AR        Vital Signs    Pre Systolic BP Rehab 143  -AR        Pre Treatment Diastolic BP 95  -AR        Post Systolic BP Rehab 160  -AR        Post Treatment Diastolic BP 92  -AR        Pretreatment Heart Rate (beats/min)   63  -KS      Pre Patient Position Sitting  -AR        Intra Patient Position Standing  -AR        Post Patient Position Sitting  -AR        Pain  "Assessment    Pain Assessment 0-10  -AR No/denies pain  -SM       Pain Score 1  -AR        Post Pain Score 1  -AR        Pain Type Acute pain  -AR        Pain Location Head  -AR        Pain Intervention(s) Repositioned;Ambulation/increased activity  -AR        Response to Interventions tolerated  -AR        Vision Assessment/Intervention    Visual Impairment visual impairment, bilaterally;diplopia;other (see comments)   Able patrially complete QSVT,\"images move\"  -AR        Vision Comment --   impaired depth perception, intermittent diplopia L eye  -AR        Cognitive Assessment/Intervention    Current Cognitive/Communication Assessment functional  -AR        Orientation Status oriented x 4  -AR        Follows Commands/Answers Questions 100% of the time;able to follow multi-step instructions  -AR        Personal Safety WNL/WFL  -AR        Personal Safety Interventions gait belt;nonskid shoes/slippers when out of bed;supervised activity  -AR        Muscle Tone Assessment    LLE Muscle Tone Assessment     mildly decreased tone  -KG    RLE Muscle Tone Assessment     mildly decreased tone  -KG    Transfer Assessment/Treatment    Transfers, Sit-Stand [place] supervision required  -AR        Transfers, Stand-Sit [place] supervision required  -AR        Transfers, Sit-Stand-Sit, Assist Device rolling walker  -AR        Functional Mobility    Functional Mobility- Ind. Level supervision required  -AR        Functional Mobility- Device rolling walker  -AR        Functional Mobility-Distance (Feet) 20  -AR        Lower Body Dressing Assessment/Training    LB Dressing Assess/Train, Clothing Type donning:;slipper socks  -AR        LB Dressing Assess/Train, Position sitting  -AR        LB Dressing Assess/Train, [place] supervision required  -AR        Balance Skills Training    Sitting-Level of Assistance Independent  -AR        Sitting-Balance Support Feet supported  -AR        Standing-Level of Assistance " Distant supervision  -AR        Static Standing Balance Support assistive device  -AR        Sensory Assessment/Intervention    LUE Light Touch    WNL  -TG     RUE Light Touch    WNL  -TG     LLE Light Touch    mild impairment  -TG     RLE Light Touch    mild impairment  -TG     Positioning and Restraints    Pre-Treatment Position sitting in chair/recliner  -AR        Post Treatment Position chair  -AR        In Chair sitting;notified nsg;call light within reach;encouraged to call for assist  -AR          11/12/17 2234 11/12/17 2045 11/12/17 1600 11/12/17 1350 11/12/17 1000    Rehab Evaluation    Document Type    therapy note (daily note)  -MM     Subjective Information    no complaints;agree to therapy  -MM     Patient Effort, Rehab Treatment    good  -MM     Symptoms Noted During/After Treatment    none  -MM     General Information    Precautions/Limitations    fall precautions  -MM     Vital Signs    Pre Systolic BP Rehab    127  -MM     Pre Treatment Diastolic BP    66  -MM     Pretreatment Heart Rate (beats/min)    66  -MM     O2 Delivery Pre Treatment    room air  -MM     Pre Patient Position    Sitting  -MM     Intra Patient Position    Standing  -MM     Post Patient Position    Sitting  -MM     Pain Assessment    Pain Assessment    0-10  -MM     Pain Score    2  -MM     Post Pain Score    2  -MM     Pain Type    Acute pain  -MM     Pain Location    Head  -MM     Cognitive Assessment/Intervention    Current Cognitive/Communication Assessment    functional  -MM     Orientation Status    oriented x 4  -MM     Follows Commands/Answers Questions    100% of the time  -MM     Personal Safety    WNL/WFL  -MM     Personal Safety Interventions    fall prevention program maintained;gait belt;nonskid shoes/slippers when out of bed  -MM     Muscle Tone Assessment    LLE Muscle Tone Assessment mildly decreased tone  -KG mildly decreased tone  -KG   mildly decreased tone  -LS    RLE Muscle Tone Assessment mildly decreased  tone  -KG mildly decreased tone  -KG   mildly decreased tone  -LS    Bed Mobility, Assessment/Treatment    Bed Mobility, Comment    UIC  -MM     Transfer Assessment/Treatment    Transfers, Sit-Stand [place]    stand by assist  -MM     Transfers, Stand-Sit [place]    stand by assist  -MM     Balance Skills Training    Sitting-Level of Assistance    Distant supervision  -MM     Sitting-Balance Support    Feet supported  -MM     Standing-Level of Assistance    Close supervision  -MM     Static Standing Balance Support    assistive device  -MM     Sensory Assessment/Intervention    Light Touch     RLE;LLE  -LS    LUE Light Touch   WNL  -TG  WNL  -LS    RUE Light Touch   WNL  -TG  WNL  -LS    LLE Light Touch   mild impairment  -TG  mild impairment   baseline neuropathy  -LS    RLE Light Touch   mild impairment  -TG  mild impairment   baseline neuropathy  -LS    Positioning and Restraints    Pre-Treatment Position    sitting in chair/recliner  -MM     Post Treatment Position    chair  -MM     In Chair    sitting;call light within reach;encouraged to call for assist;exit alarm on;with family/caregiver  -MM       11/12/17 0745 11/11/17 1935 11/11/17 1749 11/11/17 1100       Rehab Evaluation    Document Type    therapy note (daily note)  -LSA     Cognitive Assessment/Intervention    Additional Documentation    Cognitive Assessment Intervention (Group)  -LSA     Cognitive Assessment Intervention    Behavior/Mood Observations    alert;behavior appropriate to situation, WNL/WFL  -LSA     Attention    --  -LSA     Attending Skill Interventions    Fuctional for tasks fo his choosing. Difficulty to engage pt in further assessment tasks  -LSA     Organization    WNL/WFL  -LSA     Muscle Tone Assessment    LLE Muscle Tone Assessment mildly decreased tone  -LS mildly decreased tone  -KG       RLE Muscle Tone Assessment mildly decreased tone  -LS mildly decreased tone  -KG       Sensory Assessment/Intervention    Light Touch  RLE;LLE  -LS  RLE;LLE  -LS      LUE Light Touch WNL  -LS  WNL  -LS      RUE Light Touch WNL  -LS  WNL  -LS      LLE Light Touch mild impairment   baseline neuropathy  -LS  mild impairment   baseline neuropathy  -LS      RLE Light Touch mild impairment   baseline neuropathy  -LS  mild impairment   baseline neuropathy  -LS        User Key  (r) = Recorded By, (t) = Taken By, (c) = Cosigned By    Initials Name Effective Dates    MM Iat CLAUDE Anderson, PT 06/19/15 -     SM Veronique Carlos, MS CCC-SLP 06/22/15 -     AR Celi Cha, OT 06/22/15 -     LSA Martine Perez, MS CCC-SLP 06/22/15 -     KG Blanca Macdonald, RN 06/16/16 -     KS Luz Maria Maravilla, RRT 03/14/16 -     TG Katarina Garcia, RN 02/17/17 -     LS Alicia Silver, RN 06/26/17 -            Occupational Therapy Education     Title: PT OT SLP Therapies (Done)     Topic: Occupational Therapy (Done)     Point: ADL training (Done)    Description: Instruct learner(s) on proper safety adaptation and remediation techniques during self care or transfers.   Instruct in proper use of assistive devices.    Learning Progress Summary    Learner Readiness Method Response Comment Documented by Status   Patient Eager E,TB,D NR,VU Reviewed ADL retraining, tranafer training, home safety AR 11/13/17 1608 Done    Acceptance E,D NR Pt educated on appropriate safety precautions, t/f techniques, and role of therapy. CL 11/10/17 1413 Active               Point: Home exercise program (Done)    Description: Instruct learner(s) on appropriate technique for monitoring, assisting and/or progressing therapeutic exercises/activities.    Learning Progress Summary    Learner Readiness Method Response Comment Documented by Status   Patient Eager E,TB,D NR,VU Reviewed ADL retraining, tranafer training, home safety AR 11/13/17 1608 Done               Point: Precautions (Done)    Description: Instruct learner(s) on prescribed precautions during self-care and functional transfers.    Learning  Progress Summary    Learner Readiness Method Response Comment Documented by Status   Patient Eager E,TB,D NR,VU Reviewed ADL retraining, tranafer training, home safety AR 11/13/17 1608 Done    Acceptance E,D NR Pt educated on appropriate safety precautions, t/f techniques, and role of therapy. CL 11/10/17 1413 Active               Point: Body mechanics (Done)    Description: Instruct learner(s) on proper positioning and spine alignment during self-care, functional mobility activities and/or exercises.    Learning Progress Summary    Learner Readiness Method Response Comment Documented by Status   Patient Eager E,TB,D NR,VU Reviewed ADL retraining, tranafer training, home safety AR 11/13/17 1608 Done    Acceptance E,D NR Pt educated on appropriate safety precautions, t/f techniques, and role of therapy. CL 11/10/17 1413 Active                      User Key     Initials Effective Dates Name Provider Type Discipline    AR 06/22/15 -  Celi Cha, OT Occupational Therapist OT    CL 06/08/16 -  Bernice Mann, OT Occupational Therapist OT                  OT Recommendation and Plan  Anticipated Equipment Needs At Discharge:  (TBA further)  Anticipated Discharge Disposition: inpatient rehabilitation facility  Therapy Frequency: daily  Plan of Care Review  Plan Of Care Reviewed With: patient  Progress: improving  Outcome Summary/Follow up Plan: Pt achieved ADL goal and partially achieved transfer goal. Pt particiated in Quick Screen Visual Test, he reports intermittent diplopia L eye, impaired depth perception and stationary items moving in his visual field. Recommend DC home with HHOT and RW. Pt declined need for BSC.            OT Goals       11/13/17 1608 11/10/17 1414       Transfer Training OT LTG    Transfer Training OT LTG, Date Established  11/10/17  -CL     Transfer Training OT LTG, Time to Achieve  by discharge  -CL     Transfer Training OT LTG, Activity Type  toilet;sit to stand/stand to sit  -CL      Transfer Training OT LTG, [place] Level  contact guard assist  -CL     Transfer Training OT LTG, Additional Goal  AAD  -CL     Transfer Training OT LTG, Outcome goal partially met  -AR      Patient Education OT LTG    Patient Education OT LTG, Date Established  11/10/17  -CL     Patient Education OT LTG, Time to Achieve  by discharge  -CL     Patient Education OT LTG, Education Type  written program;HEP;positioning;posture/body mechanics;home safety;adaptive equipment mgmt;energy conservation  -CL     Patient Education OT LTG, Education Understanding  verbalizes understanding  -CL     Patient Education OT LTG Outcome goal ongoing  -AR      LB Dressing OT LTG    LB Dressing Goal OT LTG, Date Established  11/10/17  -CL     LB Dressing Goal OT LTG, Time to Achieve  by discharge  -CL     LB Dressing Goal OT LTG, Activity Type  Don/doff socks  -CL     LB Dressing Goal OT LTG, [place] Level  supervision required  -CL     LB Dressing Goal OT LTG, Additional Goal  AAD  -CL     LB Dressing Goal OT LTG, Outcome goal met  -AR      Activity Tolerance OT LTG    Activity Tolerance Goal OT LTG, Date Established  11/10/17  -CL     Activity Tolerance Goal OT LTG, Time to Achieve  by discharge  -CL     Activity Tolerance Goal OT LTG, Activity Level  10 min activity  -CL     Activity Tolerance Goal OT LTG, Additional Goal  1 seated rest break, VSS  -CL     Activity Tolerance Goal OT LTG, Outcome goal met  -AR        User Key  (r) = Recorded By, (t) = Taken By, (c) = Cosigned By    Initials Name Provider Type    DM Cha, OT Occupational Therapist    MARIAH Mann, OT Occupational Therapist                Outcome Measures       11/13/17 1520 11/12/17 1350       How much help from another person do you currently need...    Turning from your back to your side while in flat bed without using bedrails?  4  -MM     Moving from lying on back to sitting on the side of a flat bed without bedrails?  4  -MM     Moving to  and from a bed to a chair (including a wheelchair)?  4  -MM     Standing up from a chair using your arms (e.g., wheelchair, bedside chair)?  4  -MM     Climbing 3-5 steps with a railing?  3  -MM     To walk in hospital room?  3  -MM     AM-PAC 6 Clicks Score  22  -MM     How much help from another is currently needed...    Putting on and taking off regular lower body clothing? 3  -AR      Bathing (including washing, rinsing, and drying) 3  -AR      Toileting (which includes using toilet bed pan or urinal) 3  -AR      Putting on and taking off regular upper body clothing 3  -AR      Taking care of personal grooming (such as brushing teeth) 4  -AR      Eating meals 4  -AR      Score 20  -AR      Modified Leonardo Scale    Modified Fide Scale 2 - Slight disability.  Unable to carry out all previous activities but able to look after own affairs without assistance.  -AR      Functional Assessment    Outcome Measure Options AM-PAC 6 Clicks Daily Activity (OT)  -AR AM-PAC 6 Clicks Basic Mobility (PT)  -MM       User Key  (r) = Recorded By, (t) = Taken By, (c) = Cosigned By    Initials Name Provider Type    MM Ita Anderson, PT Physical Therapist    AR Celi Cha, OT Occupational Therapist          Time Calculation:   OT Start Time: 1520    Therapy Charges for Today     Code Description Service Date Service Provider Modifiers Qty    [id number]  OT THERAPEUTIC ACT EA 15 MIN 11/13/2017 Celi Cha OT GO 2               Celi Cha OT  11/13/2017

## 2017-11-14 VITALS
HEART RATE: 62 BPM | BODY MASS INDEX: 35.29 KG/M2 | DIASTOLIC BLOOD PRESSURE: 61 MMHG | SYSTOLIC BLOOD PRESSURE: 139 MMHG | WEIGHT: 224.87 LBS | RESPIRATION RATE: 16 BRPM | OXYGEN SATURATION: 92 % | TEMPERATURE: 98 F | HEIGHT: 67 IN

## 2017-11-14 PROCEDURE — 94640 AIRWAY INHALATION TREATMENT: CPT

## 2017-11-14 PROCEDURE — 25010000002 ONDANSETRON PER 1 MG: Performed by: NURSE PRACTITIONER

## 2017-11-14 PROCEDURE — 99239 HOSP IP/OBS DSCHRG MGMT >30: CPT | Performed by: NURSE PRACTITIONER

## 2017-11-14 RX ORDER — ACETAMINOPHEN 650 MG/1
650 SUPPOSITORY RECTAL EVERY 4 HOURS PRN
Qty: 12 SUPPOSITORY | Refills: 0 | Status: SHIPPED | OUTPATIENT
Start: 2017-11-14

## 2017-11-14 RX ADMIN — ACETAMINOPHEN 650 MG: 325 TABLET ORAL at 06:27

## 2017-11-14 RX ADMIN — ATENOLOL 25 MG: 25 TABLET ORAL at 08:14

## 2017-11-14 RX ADMIN — ONDANSETRON 4 MG: 2 INJECTION INTRAMUSCULAR; INTRAVENOUS at 02:43

## 2017-11-14 RX ADMIN — ONDANSETRON 4 MG: 2 INJECTION INTRAMUSCULAR; INTRAVENOUS at 06:27

## 2017-11-14 RX ADMIN — IPRATROPIUM BROMIDE AND ALBUTEROL SULFATE 3 ML: .5; 3 SOLUTION RESPIRATORY (INHALATION) at 12:50

## 2017-11-14 RX ADMIN — PANTOPRAZOLE SODIUM 40 MG: 40 TABLET, DELAYED RELEASE ORAL at 06:27

## 2017-11-14 RX ADMIN — ACETAMINOPHEN 650 MG: 325 TABLET ORAL at 03:18

## 2017-11-14 NOTE — DISCHARGE SUMMARY
[facility] Medicine Services  DISCHARGE SUMMARY    Patient Name: Vel James  : 1934  MRN: [id number]    Date of Admission: 2017  Date of Discharge:    Length of Stay: 5  Primary Care Physician: Kishan Ceballos MD    Consults     Date and Time Order Name Status Description    2017 1415 Inpatient Consult to Neurosurgery Completed         Hospital Course     Presenting Problem:   Occipital subdural bleed [I62.00]  ICH (intracerebral hemorrhage) [I61.9]    Active Hospital Problems (** Indicates Principal Problem)    Diagnosis Date Noted   • **ICH (intracerebral hemorrhage), left occipital [I61.9] 2017   • Hypertension [I10] 2017   • History of MI (myocardial infarction) [I25.2] 2017   • BPH (benign prostatic hyperplasia) [N40.0] 2017      Resolved Hospital Problems    Diagnosis Date Noted Date Resolved   No resolved problems to display.      Hospital Course:  Vel James is a 83 y.o. male with a past medical history significant for benign prostatic hypertrophy, hypertension treated with atenolol, and a previous distant myocardial infarction treated with balloon angioplasty.  He presented to [facility] when he developed some incoordination, headache, and visual field cuts. In the ED, he was found to have a small left occipital lobe intracerebral hemorrhage.  He was transferred to [facility] for further stroke care on a Cardene drip.  He was admitted to the ICU for close neurological monitoring.  Neurosurgery was consulted and patient did not required any surgical intervention.  He was transferred to the floor and care taken over by the hospital medicine service.  He was given Tylenol PRN for headache but patient reports it makes him nauseated.  He requested Tylenol suppository at time of discharge.  BP stable on home dose of atenolol.  Patient has remained stable and is ready for discharge home today.  He will follow up with Neurosurgery in 1  month with a CT scan of head prior to appointment.  He will follow up in the stroke clinic in 1 month and PCP with 1 week.  Discharge plans and instructions were reviewed with patient and wife and they both verbalize an understanding.      Of note, patient with history of sleep apnea.  He is requesting outpatient sleep study test.  Will defer to PCP to schedule at follow up appointment.           Day of Discharge     HPI:   Patient sitting up on couch in room.  Still with some left sided headache but states he wants to go home.  No visual changes worsened from baseline.  He feels like he will recover better at home.  Wife is at bedside.  Denies chest pain, shortness of breath or abdominal pain.      Review of Systems  Gen- No fevers, chills, + headache  CV- No chest pain, palpitations  Resp- No cough, dyspnea  GI- No N/V/D, abd pain    Otherwise ROS is negative except as mentioned in the HPI.    Vital Signs:   Temp:  [97.7 °F (36.5 °C)-98.4 °F (36.9 °C)] 98 °F (36.7 °C)  Heart Rate:  [51-76] 62  Resp:  [14-16] 16  BP: (131-157)/(61-92) 139/61     Physical Exam:  Constitutional: No acute distress, awake, alert  HENT: NCAT, mucous membranes moist  Neck: Supple  Respiratory: Clear to auscultation bilaterally, nonlabored respirations   Cardiovascular: RRR, no murmurs, rubs, or gallops, palpable pedal pulses bilaterally  Gastrointestinal: Positive bowel sounds, soft, nontender, nondistended  Musculoskeletal: No bilateral ankle edema  Psychiatric: Appropriate affect, cooperative  Neurologic: Oriented x 3, strength symmetric in all extremities, Cranial Nerves grossly intact to confrontation, speech clear  Skin: No rashes    Pertinent  and/or Most Recent Results       Results from last 7 days  Lab Units 11/13/17  0526 11/12/17  0610 11/11/17  0401 11/10/17  0413   WBC 10*3/mm3 10.37 12.03* 11.75* 8.42   HEMOGLOBIN g/dL 12.2* 11.9* 12.4* 12.1*   HEMATOCRIT % 36.9* 34.8* 37.9* 35.9*   PLATELETS 10*3/mm3 228 199 210 204    SODIUM mmol/L 140 141 140 139   POTASSIUM mmol/L 3.7 3.9 3.8 4.0   CHLORIDE mmol/L 106 108 108 109   CO2 mmol/L 26.0 27.0 26.0 27.0   BUN mg/dL 26* 22 19 18   CREATININE mg/dL 1.00 0.90 0.90 0.90   GLUCOSE mg/dL 87 91 100 86   CALCIUM mg/dL 9.1 8.8 9.3 8.9       Results from last 7 days  Lab Units 11/13/17  0526 11/12/17  0610   BILIRUBIN mg/dL 2.6* 3.1*   ALK PHOS U/L 62 56   ALT (SGPT) U/L 17 14   AST (SGOT) U/L 24 24       Results from last 7 days  Lab Units 11/10/17  0413   CHOLESTEROL mg/dL 80   TRIGLYCERIDES mg/dL 88   HDL CHOL mg/dL 24*   LDL CHOL mg/dL 44       Results from last 7 days  Lab Units 11/10/17  0413   HEMOGLOBIN A1C % 5.40     Brief Urine Lab Results     None        Microbiology Results Abnormal     None        Imaging Results (all)     Procedure Component Value Units Date/Time    CT Head Without Contrast [[id number]] Collected:  11/10/17 0924     Updated:  11/10/17 1329    Narrative:       EXAMINATION: CT HEAD WO CONTRAST-      INDICATION: Stroke.      TECHNIQUE: CT scan of the head was performed without intravenous  contrast.     The radiation dose reduction device was turned on for each scan per the  ALARA (As Low as Reasonably Achievable) protocol.     COMPARISON: 10/14/2013.      FINDINGS: There is a new hemorrhage in the left occipital region  measuring 1.6 x 3.3 cm. This is positioned abutting and just inferior to  the posterior horn of the left lateral ventricle. This abuts but does  not involve the ventricular system. There otherwise has been no change  since the examination of 10/14/2013.  The clot volume is estimated to be  10 cc.       Impression:       There is an intraaxial hemorrhage just lateral and inferior  to the occipital horn of the left lateral ventricle with an estimated  clot volume of 10 cc. There is little mass effect. This does not appear  to involve the ventricular system.     D:  11/10/2017  E:  11/10/2017     This report was finalized on 11/10/2017 1:27 PM by   Vel Hammond MD.       CT Head Without Contrast [[id number]] Collected:  11/10/17 1225     Updated:  11/10/17 2249    Narrative:       EXAMINATION: CT HEAD WO CONTRAST-      INDICATION: Possible changes; Z74.09-Other reduced mobility.     TECHNIQUE: 5 mm unenhanced images through the brain.     The radiation dose reduction device was turned on for each scan per the  ALARA (As Low as Reasonably Achievable) protocol.     COMPARISON: 11/10/2017 head CT scan at 4:49 AM.     FINDINGS: History indicates intracranial hemorrhage, headache and visual  changes.     What initially appears to represent intraventricular clot of the left  posterior horn may actually represent subependymal extension of left  occipital hemorrhage, as can be appreciated from axial image 12 to image  17. No free intraventricular hemorrhage/clot is seen. There is no  interval change from the prior study.  Largest portion of the hemorrhage  remains approximately 3.7 x 1.7 cm. There is no evidence of new  hemorrhage elsewhere, no evidence of infarct, mass or mass effect,  hydrocephalus or abnormal extraaxial collection.       Impression:       Stable appearance of patient's left-sided hemorrhage  compared to 4:49 AM exam of same day, presumably left occipital  parenchymal hemorrhage with subependymal extension. No new intracranial  disease is seen.     D:  11/10/2017  E:  11/10/2017     This report was finalized on 11/10/2017 10:47 PM by DR. Maulik Gamez MD.       XR Chest 1 View [[id number]] Collected:  11/13/17 0853     Updated:  11/13/17 0903    Narrative:       EXAMINATION: XR CHEST 1 VW-      INDICATION: Hypertension; Z74.09-Other reduced mobility;  R41.841-Cognitive communication deficit.      COMPARISON: None.     FINDINGS: Portable chest reveals underlying chronic and emphysematous  changes seen within the lung fields bilaterally. Degenerative change is  seen within the spine. Cardiac and mediastinal silhouettes are within  normal limits. No focal  parenchymal opacification is present.  No  pleural effusion or pneumothorax.           Impression:       Chronic changes seen within the lung fields with no evidence  of acute parenchymal disease.     D:  11/13/2017  E:  11/13/2017     This report was finalized on 11/13/2017 9:01 AM by Dr. Irene Galvan MD.                Discharge Details      Vel James   Home Medication Instructions BYRON:[id number]    Printed on:11/14/17 1420   Medication Information                      acetaminophen (TYLENOL) 650 MG suppository  Insert 1 suppository into the rectum Every 4 (Four) Hours As Needed for Mild Pain .             Adalimumab (HUMIRA PEN) 40 MG/0.8ML Pen-injector Kit  Inject 40 mg under the skin Every 14 (Fourteen) Days.             allopurinol (ZYLOPRIM) 300 MG tablet  Take 300 mg by mouth Daily.             atenolol (TENORMIN) 25 MG tablet  Take 25 mg by mouth Daily.             cholecalciferol (VITAMIN D3) 1000 units tablet  Take 1,000 Units by mouth Daily.             CRANBERRY PO  Take 300 mg by mouth 2 (Two) Times a Day.             famotidine (PEPCID) 20 MG tablet  Take 20 mg by mouth 2 (Two) Times a Day.             MULTIPLE VITAMIN PO  Take 1 tablet by mouth Daily.             multivitamins-minerals (PRESERVISION AREDS 2) capsule capsule  Take 1 capsule by mouth 2 (Two) Times a Day.             sildenafil (REVATIO) 20 MG tablet  Take 20 mg by mouth Daily.             tamsulosin (FLOMAX) 0.4 MG capsule 24 hr capsule  Take 1 capsule by mouth 2 (Two) Times a Day.             vitamin B-12 (CYANOCOBALAMIN) 1000 MCG tablet  Take 2,000 mcg by mouth Daily.             Vitamin D-Vitamin K (K2 PLUS D3) 100-1000 MCG-UNIT tablet  Take 1 tablet by mouth Daily.               Discharge Disposition:  [place] [facility] [place]    Discharge Diet:  Diet Instructions     Diet: Regular, Cardiac; Thin       Discharge Diet:   Regular  Cardiac      Fluid Consistency:  Thin               Discharge Activity:  Activity  Instructions     Activity as Tolerated                   Special Instructions:  CT scan of head without contrast prior to follow up appointment with Neurosurgery.      Additional Instructions for the Follow-ups that You Need to Schedule     Ambulatory Referral to Home Health    As directed    Face to Face Visit Date:  11/13/2017   Follow-up Provider for Plan of Care?:  I treated the patient in an acute care facility and will not continue treatment after discharge.   Follow-up Provider:  SIXTO MCCORMICK   Reason/Clinical Findings:  s/p CVA   Describe mobility limitations that make leaving home difficult:  Impaired functional mobility, balance, and gait   Nursing/Therapeutic Services Requested:   Physical Therapy  Occupational Therapy      PT orders:   Gait Training  Transfer training  Strengthening      Weight Bearing Status:  As Tolerated   Occupational orders:   Activities of daily living  Strengthening  Fine motor  Home safety assessment          Discharge Follow-up with PCP    As directed    Follow Up Details:  within 1 week for hospital follow up       Discharge Follow-up with Specialty: Neurosurgery; 1 Month    As directed    Specialty:  Neurosurgery   Follow Up:  1 Month       Discharge Follow-up with Specialty: stroke clinic; 1 Month    As directed    Specialty:  stroke clinic   Follow Up:  1 Month       CT Head Without Contrast    Dec 05, 2017    Reason for Exam:  follow hemorrhagic stroke               Time Spent on Discharge:  40 minutes    Kathy Feliciano, TOSHIA  11/14/17  2:20 PM

## 2017-11-14 NOTE — PROGRESS NOTES
Continued Stay Note  [facility]     Patient Name: Vel James  MRN: [id number]  Today's Date: 11/14/2017    Admit Date: 11/9/2017          Discharge Plan       11/14/17 0928    Case Management/Social Work Plan    Plan Home w/ HH    Patient/Family In Agreement With Plan yes    Additional Comments Spoke with patient at bedside. Per therapy recs patient would benefit from HH/PT/OT. Patient agreeable and per [facility] request [facility] A referral for a RW has been made to [facility] and will be delivered to patient's room. Plan at this time is home w/ HH. CM will follow.               Discharge Codes     None        Expected Discharge Date and Time     Expected Discharge Date Expected Discharge Time    Nov 20, 2017             Barb Noriega RN

## 2017-11-24 ENCOUNTER — TRANSCRIBE ORDERS (OUTPATIENT)
Dept: ADMINISTRATIVE | Facility: HOSPITAL | Age: 82
End: 2017-11-24

## 2017-11-24 DIAGNOSIS — I61.4 RIGHT-SIDED NONTRAUMATIC INTRACEREBRAL HEMORRHAGE OF CEREBELLUM (HCC): Primary | ICD-10-CM

## 2017-12-04 ENCOUNTER — HOSPITAL ENCOUNTER (OUTPATIENT)
Dept: CT IMAGING | Facility: HOSPITAL | Age: 82
Discharge: HOME | End: 2017-12-04
Attending: FAMILY MEDICINE | Admitting: FAMILY MEDICINE

## 2017-12-04 DIAGNOSIS — I61.4 RIGHT-SIDED NONTRAUMATIC INTRACEREBRAL HEMORRHAGE OF CEREBELLUM (HCC): ICD-10-CM

## 2017-12-04 PROCEDURE — 70450 CT HEAD/BRAIN W/O DYE: CPT

## 2017-12-13 ENCOUNTER — OFFICE VISIT (OUTPATIENT)
Dept: NEUROSURGERY | Facility: CLINIC | Age: 82
End: 2017-12-13

## 2017-12-13 VITALS
SYSTOLIC BLOOD PRESSURE: 122 MMHG | OXYGEN SATURATION: 98 % | BODY MASS INDEX: 35.16 KG/M2 | WEIGHT: 224 LBS | DIASTOLIC BLOOD PRESSURE: 76 MMHG | HEIGHT: 67 IN

## 2017-12-13 DIAGNOSIS — I61.0 NONTRAUMATIC SUBCORTICAL HEMORRHAGE OF LEFT CEREBRAL HEMISPHERE (HCC): ICD-10-CM

## 2017-12-13 DIAGNOSIS — G43.109 OCULAR MIGRAINE: Primary | ICD-10-CM

## 2017-12-13 PROCEDURE — 99214 OFFICE O/P EST MOD 30 MIN: CPT | Performed by: NEUROLOGICAL SURGERY

## 2017-12-13 NOTE — PROGRESS NOTES
Subjective     Chief Complaint: Stroke    Patient ID: Vel James is a 83 y.o. male is here today for follow-up.    Stroke   This is a new problem. The current episode started more than 1 month ago. The problem occurs rarely. The problem has been gradually improving. Associated symptoms include congestion, coughing, fatigue, headaches, neck pain, numbness, a visual change and weakness. Pertinent negatives include no abdominal pain, anorexia, arthralgias, chest pain, chills, diaphoresis, fever, joint swelling, myalgias, nausea, rash, sore throat or vomiting. Nothing aggravates the symptoms. He has tried nothing for the symptoms. The treatment provided no relief.       This is an 83-year-old man who I saw in consultation in the hospital about a month ago for a right parieto-occipital/intraventricular spontaneous hemorrhage.  He was discharged with nonsurgical management.  He presents today for routine follow-up.  He reports that his symptoms have almost completely resolved, however he is complaining of some worsening vision that he states was precipitated by his stroke event about a month ago.    The following portions of the patient's history were reviewed and updated as appropriate: allergies, current medications, past family history, past medical history, past social history, past surgical history and problem list.    Family history:   Family History   Problem Relation Age of Onset   • Stroke Maternal Grandfather        Social history:   Social History     Social History   • Marital status:      Spouse name: N/A   • Number of children: N/A   • Years of education: N/A     Occupational History   • Not on file.     Social History Main Topics   • Smoking status: Never Smoker   • Smokeless tobacco: Not on file   • Alcohol use No   • Drug use: No   • Sexual activity: Defer     Other Topics Concern   • Not on file     Social History Narrative       Review of Systems   Constitutional: Positive for activity change  and fatigue. Negative for appetite change, chills, diaphoresis, fever and unexpected weight change.   HENT: Positive for congestion and hearing loss. Negative for dental problem, drooling, ear discharge, ear pain, facial swelling, mouth sores, nosebleeds, postnasal drip, rhinorrhea, sinus pressure, sneezing, sore throat, tinnitus, trouble swallowing and voice change.    Eyes: Positive for photophobia and visual disturbance. Negative for pain, discharge, redness and itching.   Respiratory: Positive for apnea and cough. Negative for choking, chest tightness, shortness of breath, wheezing and stridor.    Cardiovascular: Negative for chest pain, palpitations and leg swelling.   Gastrointestinal: Negative for abdominal distention, abdominal pain, anal bleeding, anorexia, blood in stool, constipation, diarrhea, nausea, rectal pain and vomiting.   Endocrine: Negative for cold intolerance, heat intolerance, polydipsia, polyphagia and polyuria.   Genitourinary: Positive for decreased urine volume and frequency. Negative for difficulty urinating, dysuria, enuresis, flank pain, genital sores, hematuria and urgency.   Musculoskeletal: Positive for back pain, gait problem, neck pain and neck stiffness. Negative for arthralgias, joint swelling and myalgias.   Skin: Negative for color change, pallor, rash and wound.   Allergic/Immunologic: Negative for environmental allergies, food allergies and immunocompromised state.   Neurological: Positive for dizziness, speech difficulty, weakness, numbness and headaches. Negative for tremors, seizures, syncope, facial asymmetry and light-headedness.   Hematological: Negative for adenopathy. Does not bruise/bleed easily.   Psychiatric/Behavioral: Positive for confusion. Negative for agitation, behavioral problems, decreased concentration, dysphoric mood, hallucinations, self-injury, sleep disturbance and suicidal ideas. The patient is not nervous/anxious and is not hyperactive.    All other  "systems reviewed and are negative.      Objective   Blood pressure 122/76, height 170.2 cm (67\"), weight 102 kg (224 lb), SpO2 98 %.  Body mass index is 35.08 kg/(m^2).    Physical Exam   Constitutional: He is oriented to person, place, and time. He appears well-developed and well-nourished.  Non-toxic appearance. No distress.   HENT:   Head: Normocephalic and atraumatic.   Mouth/Throat: Oropharynx is clear and moist.   Eyes: EOM are normal. Pupils are equal, round, and reactive to light. Right eye exhibits no discharge. Left eye exhibits no discharge.   Neck: Phonation normal. No JVD present. No tracheal deviation present. No thyromegaly present.   Cardiovascular: Normal rate and regular rhythm.    Pulmonary/Chest: Effort normal. No stridor. No respiratory distress. He has no wheezes.   Abdominal: Soft. Normal appearance. He exhibits no distension. There is no tenderness.   Musculoskeletal: He exhibits no edema.   Muscle Group     L          R  Deltoid                5          5  Bicep                  5          5  Tricep                 5          5                       5          5  Hand IO              5          5  Hip Flexor           5          5  Knee Extensor   5          5     ADF                    5          5  APF                    5          5      Neurological: He is alert and oriented to person, place, and time. He displays normal reflexes. No cranial nerve deficit or sensory deficit. He exhibits normal muscle tone. GCS eye subscore is 4. GCS verbal subscore is 5. GCS motor subscore is 6.   Reflex Scores:       Tricep reflexes are 1+ on the right side and 1+ on the left side.       Bicep reflexes are 1+ on the right side and 1+ on the left side.       Brachioradialis reflexes are 1+ on the right side and 1+ on the left side.       Patellar reflexes are 1+ on the right side and 1+ on the left side.       Achilles reflexes are 1+ on the right side and 1+ on the left side.  CN II-XII grossly " intact with the exception of some decreased peripheral vision in the right eye on the temporal side.    Gait not tested    No pronator drift. FTN testing performed without dysmetria or bradykinesia.   Skin: Skin is warm and dry. He is not diaphoretic. No erythema.   Psychiatric: He has a normal mood and affect. His speech is normal and behavior is normal. Judgment and thought content normal.   Nursing note and vitals reviewed.        Assessment/Plan     Independent Review of Radiographic Studies:      Available for my review is a CT scan of the head that was performed on 12/4/2017.  A comparison CT is also available from 11/9/17.  This discloses interval resolution of the intraparenchymal hemorrhage situated in the left posterior temporal and anterior parietal lobes.  There is a significant amount of diffuse, cortical atrophy.  There is hydrocephalus ex vacuo.    Medical Decision Making:      This is an 83-year-old man with a resolving left posterior temporal parietal lobe hemorrhage.  The presumptive etiology for this hemorrhage who is hypertensive.  There is no indication for surgical intervention at this point.  I reviewed the signs and symptoms of stroke with him, and I directed him to contact my office with new, or worsening symptoms.    From the standpoint of his visual disturbances and headaches, it sounds to me like he is having ocular migraines.  He does have a history of ocular migraines in the past, so I made a referral to neurology to have this evaluated.    I also recommended that he visit with his eye doctor, as he does have a history of macular degeneration, and he is reporting that he feels like his vision is worse.  He does have a right temporal field cut on the right eye which might be related to the stroke.  Unfortunately it is really no treatment for this, but I do think it makes sense to have his visual fields document and have a thorough ophthalmological examination to ensure that there are no  other treatable causes for vision loss at work.    He can follow-up with me on an as-needed basis.    Diagnoses and all orders for this visit:    Ocular migraine  -     Ambulatory Referral to Neurology    Nontraumatic subcortical hemorrhage of left cerebral hemisphere        Return if symptoms worsen or fail to improve.           This document signed by KRYSTAL Regalado MD December 13, 2017 2:47 PM

## 2018-02-12 ENCOUNTER — OFFICE VISIT (OUTPATIENT)
Dept: CARDIOLOGY | Facility: CLINIC | Age: 83
End: 2018-02-12

## 2018-02-12 VITALS
BODY MASS INDEX: 35.03 KG/M2 | HEART RATE: 64 BPM | SYSTOLIC BLOOD PRESSURE: 142 MMHG | WEIGHT: 223.2 LBS | DIASTOLIC BLOOD PRESSURE: 82 MMHG | HEIGHT: 67 IN

## 2018-02-12 DIAGNOSIS — I25.10 CORONARY ARTERY DISEASE INVOLVING NATIVE CORONARY ARTERY OF NATIVE HEART WITHOUT ANGINA PECTORIS: ICD-10-CM

## 2018-02-12 DIAGNOSIS — I10 ESSENTIAL HYPERTENSION: ICD-10-CM

## 2018-02-12 DIAGNOSIS — I67.9 CVD (CEREBROVASCULAR DISEASE): Primary | ICD-10-CM

## 2018-02-12 PROCEDURE — 99204 OFFICE O/P NEW MOD 45 MIN: CPT | Performed by: INTERNAL MEDICINE

## 2018-02-12 RX ORDER — NAPROXEN SODIUM 220 MG
220 TABLET ORAL 2 TIMES DAILY PRN
COMMUNITY

## 2018-02-12 RX ORDER — ATENOLOL 25 MG/1
TABLET ORAL
Refills: 2 | COMMUNITY
Start: 2017-12-19

## 2018-02-12 RX ORDER — LISINOPRIL 5 MG/1
5 TABLET ORAL DAILY
Qty: 30 TABLET | Refills: 11 | Status: SHIPPED | OUTPATIENT
Start: 2018-02-12

## 2018-02-12 NOTE — PROGRESS NOTES
[facility] at [facility]  Consultation H&P  Vel James  1934  [phone]    VISIT DATE:  02/12/2018    PCP: Kishan Ceballos MD  [address] [address]    IDENTIFICATION: A 83 y.o. male retired [employer] / from [employer].    CC:  Chief Complaint   Patient presents with   • pain in the legs     new patient   • stroke in 2017       PROBLEM LIST:  CAD-remote PTCA [facility]  HTN  HL  11/17 80/88/24/44  ICH-11/17 L occipital-conservative therapy  ? CHIQUITA  BPH  Psoriasis-Dr Arcos  Gout  Macular degeneration    Allergies  No Known Allergies    Current Medications    Current Outpatient Prescriptions:   •  acetaminophen (TYLENOL) 650 MG suppository, Insert 1 suppository into the rectum Every 4 (Four) Hours As Needed for Mild Pain ., Disp: 12 suppository, Rfl: 0  •  Adalimumab (HUMIRA PEN) 40 MG/0.8ML Pen-injector Kit, Inject 40 mg under the skin Every 14 (Fourteen) Days., Disp: , Rfl:   •  allopurinol (ZYLOPRIM) 300 MG tablet, Take 300 mg by mouth Daily., Disp: , Rfl:   •  atenolol (TENORMIN) 25 MG tablet, TK 2 TS PO QAM AND 1 QPM, Disp: , Rfl: 2  •  cholecalciferol (VITAMIN D3) 1000 units tablet, Take 1,000 Units by mouth Daily., Disp: , Rfl:   •  CRANBERRY PO, Take 300 mg by mouth 2 (Two) Times a Day., Disp: , Rfl:   •  MULTIPLE VITAMIN PO, Take 1 tablet by mouth Daily., Disp: , Rfl:   •  multivitamins-minerals (PRESERVISION AREDS 2) capsule capsule, Take 1 capsule by mouth 2 (Two) Times a Day., Disp: , Rfl:   •  naproxen sodium (ALEVE) 220 MG tablet, Take 220 mg by mouth 2 (Two) Times a Day As Needed., Disp: , Rfl:   •  tamsulosin (FLOMAX) 0.4 MG capsule 24 hr capsule, Take 1 capsule by mouth 2 (Two) Times a Day., Disp: , Rfl:   •  vitamin B-12 (CYANOCOBALAMIN) 1000 MCG tablet, Take 2,000 mcg by mouth Daily., Disp: , Rfl:   •  Vitamin D-Vitamin K (K2 PLUS D3) 100-1000 MCG-UNIT tablet, Take 1 tablet by mouth Daily., Disp: , Rfl:      History of Present Illness   HPI Pt  in consult post admission to [facility] w intraCranial hemorrhage November 2017.  He was noted with accelerated hypertension during that event.  Known w significant macular degeneration however had acute R eye impairment day of cerebral insult.    Pt denies any chest pain, dyspnea at rest, dyspnea on exertion, orthopnea, PND, palpitations, lower extremity edema, or claudication. Pt denies history of CHF, DVT, PE, MI, CVA, TIA, or rheumatic fever.       ROS  Review of Systems   Constitution: Positive for malaise/fatigue. Negative for chills, fever, weakness, night sweats, weight gain and weight loss.   HENT: Negative for hearing loss and nosebleeds.    Eyes: Negative for blurred vision, vision loss in left eye, vision loss in right eye, visual disturbance and visual halos.   Cardiovascular: Negative for chest pain, claudication, cyanosis, dyspnea on exertion, irregular heartbeat, leg swelling, near-syncope, orthopnea, palpitations, paroxysmal nocturnal dyspnea and syncope.   Respiratory: Negative for cough, hemoptysis, shortness of breath, snoring and wheezing.    Endocrine: Negative for cold intolerance, heat intolerance, polydipsia, polyphagia and polyuria.   Hematologic/Lymphatic: Negative for adenopathy and bleeding problem. Does not bruise/bleed easily.   Skin: Negative for dry skin, poor wound healing and rash.   Musculoskeletal: Positive for back pain, joint pain and muscle weakness. Negative for falls, joint swelling, muscle cramps, myalgias and neck pain.   Gastrointestinal: Negative for bloating, abdominal pain, change in bowel habit, bowel incontinence, constipation, diarrhea, dysphagia, excessive appetite, heartburn, hematemesis, hematochezia, jaundice, melena, nausea and vomiting.   Genitourinary: Negative for bladder incontinence, dysuria, flank pain, hematuria, hesitancy and nocturia.   Neurological: Negative for aphonia, excessive daytime sleepiness, dizziness, focal weakness, headaches, light-headedness,  "loss of balance, seizures, sensory change, tremors and vertigo.   Psychiatric/Behavioral: Negative for altered mental status, depression, memory loss, substance abuse and suicidal ideas. The patient is not nervous/anxious.        SOCIAL HX  Social History     Social History   • Marital status:      Spouse name: N/A   • Number of children: N/A   • Years of education: N/A     Occupational History   • Not on file.     Social History Main Topics   • Smoking status: Never Smoker   • Smokeless tobacco: Never Used   • Alcohol use Yes      Comment: occas   • Drug use: No   • Sexual activity: Defer     Other Topics Concern   • Not on file     Social History Narrative       FAMILY HX  Family History   Problem Relation Age of Onset   • Stroke Maternal Grandfather    • Heart failure Mother        Vitals:    02/12/18 1352   BP: 142/82   BP Location: Right arm   Patient Position: Sitting   Pulse: 64   Weight: 101 kg (223 lb 3.2 oz)   Height: 170.2 cm (67\")       PHYSICAL EXAMINATION:  Physical Exam   Constitutional: He is oriented to person, place, and time. He appears well-developed and well-nourished. No distress.   Elderly male walks w kyphotic slow gait w cane   HENT:   Head: Normocephalic and atraumatic.   Nose: Nose normal.   Mouth/Throat: Uvula is midline, oropharynx is clear and moist and mucous membranes are normal.   Eyes: Conjunctivae and EOM are normal. Pupils are equal, round, and reactive to light. No scleral icterus.   Neck: Normal range of motion. Neck supple. No hepatojugular reflux and no JVD present. Carotid bruit is not present. No tracheal deviation present. No thyromegaly present.   Cardiovascular: Normal rate, regular rhythm, S1 normal, S2 normal, intact distal pulses and normal pulses.  PMI is not displaced.  Exam reveals no gallop, no distant heart sounds, no friction rub, no midsystolic click and no opening snap.    No murmur heard.  Pulses:       Radial pulses are 2+ on the right side, and 2+ on " the left side.        Dorsalis pedis pulses are 2+ on the right side, and 2+ on the left side.        Posterior tibial pulses are 2+ on the right side, and 2+ on the left side.   Pulmonary/Chest: Effort normal and breath sounds normal. He has no wheezes. He has no rhonchi. He has no rales.   Abdominal: Soft. Bowel sounds are normal. He exhibits no mass. There is no tenderness. There is no guarding.   Musculoskeletal: He exhibits no edema or tenderness.   Lymphadenopathy:     He has no cervical adenopathy.   Neurological: He is alert and oriented to person, place, and time.   Skin: Skin is warm, dry and intact. No rash noted. No cyanosis or erythema. Nails show no clubbing.   Psychiatric: He has a normal mood and affect. His behavior is normal.   Rambling speech pattern   Nursing note and vitals reviewed.      Diagnostic Data:  Procedures  Lab Results   Component Value Date    TRIG 88 11/10/2017    HDL 24 (L) 11/10/2017    LDLDIRECT 44 11/10/2017     Lab Results   Component Value Date    GLUCOSE 87 11/13/2017    BUN 26 (H) 11/13/2017    CREATININE 1.00 11/13/2017     11/13/2017    K 3.7 11/13/2017     11/13/2017    CO2 26.0 11/13/2017     Lab Results   Component Value Date    HGBA1C 5.40 11/10/2017     Lab Results   Component Value Date    WBC 10.37 11/13/2017    HGB 12.2 (L) 11/13/2017    HCT 36.9 (L) 11/13/2017     11/13/2017       ASSESSMENT:   Diagnosis Plan   1. CVD (cerebrovascular disease)     2. Essential hypertension     3. Coronary artery disease involving native coronary artery of native heart without angina pectoris           PLAN:  CAD remote PTCA no recent ischemic evaluation.  Documentation of LVEF warranted if none at outside provider.    Htn -not ideally controlled.  Initiate lisinopril 5.    CVD post ICH- home monitoring of bp warranted w etoh/sodium restriction    Saroj West MD, [facility]

## 2018-02-13 ENCOUNTER — TELEPHONE (OUTPATIENT)
Dept: CARDIOLOGY | Facility: CLINIC | Age: 83
End: 2018-02-13

## 2018-02-13 NOTE — TELEPHONE ENCOUNTER
Patient called asking if he was to stop his atenolol. Informed patient Dr West started lisinopril but di not stop his atenolol at this time. Patient verbalized understanding.

## 2018-03-02 ENCOUNTER — OFFICE VISIT (OUTPATIENT)
Dept: NEUROLOGY | Facility: CLINIC | Age: 83
End: 2018-03-02

## 2018-03-02 VITALS — DIASTOLIC BLOOD PRESSURE: 75 MMHG | SYSTOLIC BLOOD PRESSURE: 152 MMHG | OXYGEN SATURATION: 99 % | HEART RATE: 64 BPM

## 2018-03-02 DIAGNOSIS — R44.1 SPELLS OF FORMED VISUAL HALLUCINATIONS: Primary | ICD-10-CM

## 2018-03-02 DIAGNOSIS — H54.7 POOR VISION: ICD-10-CM

## 2018-03-02 DIAGNOSIS — Z86.69 HISTORY OF MIGRAINE: ICD-10-CM

## 2018-03-02 PROCEDURE — 99203 OFFICE O/P NEW LOW 30 MIN: CPT | Performed by: NURSE PRACTITIONER

## 2018-03-22 ENCOUNTER — HOSPITAL ENCOUNTER (OUTPATIENT)
Dept: CT IMAGING | Facility: HOSPITAL | Age: 83
Discharge: HOME | End: 2018-03-22
Admitting: NURSE PRACTITIONER

## 2018-03-22 DIAGNOSIS — I61.0 NONTRAUMATIC SUBCORTICAL HEMORRHAGE OF LEFT CEREBRAL HEMISPHERE (HCC): ICD-10-CM

## 2018-03-22 PROCEDURE — 70450 CT HEAD/BRAIN W/O DYE: CPT

## 2018-04-09 ENCOUNTER — APPOINTMENT (OUTPATIENT)
Dept: GENERAL RADIOLOGY | Facility: HOSPITAL | Age: 83
End: 2018-04-09
Attending: FAMILY MEDICINE

## 2018-04-09 ENCOUNTER — TRANSCRIBE ORDERS (OUTPATIENT)
Dept: ADMINISTRATIVE | Facility: HOSPITAL | Age: 83
End: 2018-04-09

## 2018-04-09 DIAGNOSIS — M51.36 LUMBAR DEGENERATIVE DISC DISEASE: Primary | ICD-10-CM

## 2018-04-11 ENCOUNTER — HOSPITAL ENCOUNTER (OUTPATIENT)
Dept: GENERAL RADIOLOGY | Facility: HOSPITAL | Age: 83
Discharge: HOME | End: 2018-04-11
Attending: FAMILY MEDICINE | Admitting: FAMILY MEDICINE

## 2018-04-11 DIAGNOSIS — M51.36 LUMBAR DEGENERATIVE DISC DISEASE: ICD-10-CM

## 2018-04-11 PROCEDURE — 72114 X-RAY EXAM L-S SPINE BENDING: CPT

## 2018-04-13 ENCOUNTER — TRANSCRIBE ORDERS (OUTPATIENT)
Dept: PAIN MEDICINE | Facility: CLINIC | Age: 83
End: 2018-04-13

## 2018-04-13 DIAGNOSIS — M51.36 DDD (DEGENERATIVE DISC DISEASE), LUMBAR: Primary | ICD-10-CM

## 2018-04-23 ENCOUNTER — TELEPHONE (OUTPATIENT)
Dept: PAIN MEDICINE | Facility: CLINIC | Age: 83
End: 2018-04-23

## 2021-09-01 NOTE — PROGRESS NOTES
INTENSIVIST NOTE     Hospital:  LOS: 3 days   Mr. Vel James, 83 y.o. male is followed for:   Principal Problem:    ICH (intracerebral hemorrhage), left occipital  Active Problems:    Hypertension    History of MI (myocardial infarction)    BPH (benign prostatic hyperplasia)          SUBJECTIVE   Subjective  Vel James is a 83 y.o. male with a past medical history significant for benign prostatic hypertrophy, hypertension treated with atenolol, and a previous distant myocardial infarction treated with balloon angioplasty who was in his usual state of health today when he was retrieving some gasoline for his lawnmower when he developed some incoordination, headache, and visual field cuts. He was brought to [facility] where upon he was found to have a small left occipital lobe intracerebral hemorrhage. Blood pressure results of the been able to review showed that he is had a maximum of approximately 200 mmHg systolic. We are consulted for transfer to our facility for further stroke care. He arrives on a Cardene drip and does complain of visual field cuts. He also has severe macular degeneration but states these cuts are different than his previous vision problems. He has stated that over the past several years he has noted some visual changes that were intermittent that he was concerned could possibly be transient ischemic attacks. He otherwise has not had any other complaints of neurologic changes either in the past or currently. He denies any problems with recent fevers, chills, night sweats. He does not have any chest pain, shortness of breath, or palpitations. He has not had any recent medication changes. He is not on aspirin or Plavix.    Interval History:  No changes or events overnight. Transferred to telemetry. Slight increase in WBC's this morning. C/O headaches yesterday. Reports he feels more lucid today.      The patient's relevant past medical, surgical and social history were  "reviewed and updated in Epic as appropriate.        OBJECTIVE     Vital Sign Min/Max for last 24 hours  Temp  Min: 97.6 °F (36.4 °C)  Max: 98.2 °F (36.8 °C)   BP  Min: 106/85  Max: 162/92   Pulse  Min: 60  Max: 80   Resp  Min: 15  Max: 18   SpO2  Min: 90 %  Max: 96 %   No Data Recorded           Intake/Output Summary (Last 24 hours) at 11/12/17 1027  Last data filed at 11/12/17 0601   Gross per 24 hour   Intake                0 ml   Output              600 ml   Net             -600 ml      Flowsheet Rows         First Filed Value    Admission Height  67\" (170.2 cm) Documented at 11/09/2017 1410    Admission Weight  224 lb 13.9 oz (102 kg) Documented at 11/09/2017 1410        Body mass index is 35.22 kg/(m^2).   Last 3 weights    11/09/17  1410   Weight: 224 lb 13.9 oz (102 kg)        Telemetry: SR      Medications:    atenolol 25 mg Oral Daily   ipratropium-albuterol 3 mL Nebulization 4x Daily - RT   pantoprazole 40 mg Oral Q AM       Objective    Physical Exam:  Constitutional:  Appears well-developed and well-nourished. No distress. Up in chair. Conversant.  HEENT:  Normocephalic and atraumatic. PERRL  Neck:  Neck supple. No JVD present.   CV: Normal rate, regular rhythm, intact distal pulses.  No gallop, murmur or rub.   Pulmonary/Chest: Effort normal and breath sounds normal. No respiratory distress. No wheezes, rhonchi or rales.   Abdominal: Soft. + BS.  No distension and no mass. There is no tenderness.   Musculoskeletal: Normal muscle tone and strength  Neurological: Alert and oriented to person, place, and time.  No focal deficits. Slight difficulty with word finding.  Skin: Skin is warm and dry. No rash noted.   Extremities:  No clubbing, edema or cyanosis  Psychiatric: Normal mood and affect. Behavior is normal.       Results from last 7 days  Lab Units 11/12/17  0610 11/11/17  0401 11/10/17  0413   WBC 10*3/mm3 12.03* 11.75* 8.42   HEMOGLOBIN g/dL 11.9* 12.4* 12.1*   PLATELETS 10*3/mm3 199 210 204 "       Results from last 7 days  Lab Units 11/12/17  0610 11/11/17  0401 11/10/17  0413   SODIUM mmol/L 141 140 139   POTASSIUM mmol/L 3.9 3.8 4.0   CO2 mmol/L 27.0 26.0 27.0   CREATININE mg/dL 0.90 0.90 0.90   MAGNESIUM mg/dL 2.3  --   --    PHOSPHORUS mg/dL 2.9  --   --    GLUCOSE mg/dL 91 100 86   TRIGLYCERIDES mg/dL  --   --  88     Estimated Creatinine Clearance: 70.8 mL/min (by C-G formula based on Cr of 0.9).      No results found for: BNP    Results from last 7 days  Lab Units 11/10/17  0413   HEMOGLOBIN A1C % 5.40         No results found for: TSH  No results found for: LACTATE  No results found for: CORTISOL  No results found for: PREALBUMIN  No results found for: PROCALCITO   I reviewed the patient's results and images.   Images: None Today.     Assessment/Plan   ASSESSMENT / PLAN     83 y.o.male:    Principal Problem:    ICH (intracerebral hemorrhage), left occipital  Active Problems:    Hypertension    History of MI (myocardial infarction)    BPH (benign prostatic hyperplasia)       Assessment & Plan      Pleasant 83-year-old gentleman.  He reports some confusion after being transferred to his new room on telemetry but states he is much more lucid today.  Continue with physical and occupational therapy.  Continue nebulized bronchodilators.  Continue atenolol for hypertension.  Watch white blood cell count.  Aggressive pulmonary hygiene.      Neurochecks every shift  Up with assistance  OT/PT/speech  Protonix for heartburn  Start nebulized bronchodilators for productive cough  Continue atenolol for his hypertension  Am labs, Chest Xray for cough.     VTE Prophylaxis:SCDS     Stress Ulcer Prophylaxis:protonix       Plan of care and goals reviewed with mulitdisciplinary team at daily rounds.   Coordination of care.   I discussed the patient's findings and my recommendations with patient     Time spent: 30 min (It does not include procedure time).    TOSHIA Arana, ACNP-BC  Pulmonary & Critical Care  Medicine  11/12/17 10:27 AM     *Please note that portions of this note were completed with a voice recognition program. Efforts were made to edit the dictations, but occasionally words are mistranscribed.   ---

## 2021-12-29 ENCOUNTER — COMPLETE SKIN EXAM (OUTPATIENT)
Dept: URBAN - NONMETROPOLITAN AREA CLINIC 2 | Facility: CLINIC | Age: 86
Setting detail: DERMATOLOGY
End: 2021-12-29

## 2021-12-29 DIAGNOSIS — C51.9 MALIGNANT NEOPLASM OF VULVA, UNSPECIFIED: ICD-10-CM

## 2021-12-29 DIAGNOSIS — L57.0 ACTINIC KERATOSIS: ICD-10-CM

## 2021-12-29 PROCEDURE — 96900 ACTINOTHERAPY UV LIGHT: CPT

## 2021-12-29 PROCEDURE — 99214 OFFICE O/P EST MOD 30 MIN: CPT

## 2022-01-04 ENCOUNTER — PDT (OUTPATIENT)
Dept: URBAN - NONMETROPOLITAN AREA CLINIC 2 | Facility: CLINIC | Age: 87
Setting detail: DERMATOLOGY
End: 2022-01-04

## 2022-01-04 DIAGNOSIS — L57.0 ACTINIC KERATOSIS: ICD-10-CM

## 2022-01-04 PROCEDURE — 96900 ACTINOTHERAPY UV LIGHT: CPT

## 2022-01-13 ENCOUNTER — PDT (OUTPATIENT)
Dept: URBAN - NONMETROPOLITAN AREA CLINIC 2 | Facility: CLINIC | Age: 87
Setting detail: DERMATOLOGY
End: 2022-01-13

## 2022-01-13 DIAGNOSIS — D48.5 NEOPLASM OF UNCERTAIN BEHAVIOR OF SKIN: ICD-10-CM

## 2022-01-13 DIAGNOSIS — Z85.828 PERSONAL HISTORY OF OTHER MALIGNANT NEOPLASM OF SKIN: ICD-10-CM

## 2022-01-13 DIAGNOSIS — L82.1 OTHER SEBORRHEIC KERATOSIS: ICD-10-CM

## 2022-01-13 PROCEDURE — 96900 ACTINOTHERAPY UV LIGHT: CPT

## 2022-01-18 ENCOUNTER — PDT (OUTPATIENT)
Dept: URBAN - NONMETROPOLITAN AREA CLINIC 2 | Facility: CLINIC | Age: 87
Setting detail: DERMATOLOGY
End: 2022-01-18

## 2022-01-18 DIAGNOSIS — Z08 ENCOUNTER FOR FOLLOW-UP EXAMINATION AFTER COMPLETED TREATMENT FOR MALIGNANT NEOPLASM: ICD-10-CM

## 2022-01-18 DIAGNOSIS — Z48.817 ENCOUNTER FOR SURGICAL AFTERCARE FOLLOWING SURGERY ON THE SKIN AND SUBCUTANEOUS TISSUE: ICD-10-CM

## 2022-01-18 PROCEDURE — 96900 ACTINOTHERAPY UV LIGHT: CPT

## 2022-01-20 ENCOUNTER — PDT (OUTPATIENT)
Dept: URBAN - NONMETROPOLITAN AREA CLINIC 2 | Facility: CLINIC | Age: 87
Setting detail: DERMATOLOGY
End: 2022-01-20

## 2022-01-20 DIAGNOSIS — B35.1 TINEA UNGUIUM: ICD-10-CM

## 2022-01-20 PROCEDURE — 96900 ACTINOTHERAPY UV LIGHT: CPT

## 2022-01-25 ENCOUNTER — PDT (OUTPATIENT)
Dept: URBAN - NONMETROPOLITAN AREA CLINIC 2 | Facility: CLINIC | Age: 87
Setting detail: DERMATOLOGY
End: 2022-01-25

## 2022-01-25 DIAGNOSIS — Z32.02 ENCOUNTER FOR PREGNANCY TEST, RESULT NEGATIVE: ICD-10-CM

## 2022-01-25 PROCEDURE — 96900 ACTINOTHERAPY UV LIGHT: CPT

## 2022-01-27 ENCOUNTER — PDT (OUTPATIENT)
Dept: URBAN - NONMETROPOLITAN AREA CLINIC 2 | Facility: CLINIC | Age: 87
Setting detail: DERMATOLOGY
End: 2022-01-27

## 2022-01-27 DIAGNOSIS — L57.0 ACTINIC KERATOSIS: ICD-10-CM

## 2022-01-27 DIAGNOSIS — L30.4 ERYTHEMA INTERTRIGO: ICD-10-CM

## 2022-01-27 PROCEDURE — 96900 ACTINOTHERAPY UV LIGHT: CPT

## 2022-02-01 ENCOUNTER — PDT (OUTPATIENT)
Dept: URBAN - NONMETROPOLITAN AREA CLINIC 2 | Facility: CLINIC | Age: 87
Setting detail: DERMATOLOGY
End: 2022-02-01

## 2022-02-01 DIAGNOSIS — L01.01 NON-BULLOUS IMPETIGO: ICD-10-CM

## 2022-02-01 DIAGNOSIS — L20.84 INTRINSIC (ALLERGIC) ECZEMA: ICD-10-CM

## 2022-02-01 PROCEDURE — 96900 ACTINOTHERAPY UV LIGHT: CPT

## 2022-02-03 ENCOUNTER — PDT (OUTPATIENT)
Dept: URBAN - NONMETROPOLITAN AREA CLINIC 2 | Facility: CLINIC | Age: 87
Setting detail: DERMATOLOGY
End: 2022-02-03

## 2022-02-03 DIAGNOSIS — C44.319 BASAL CELL CARCINOMA OF SKIN OF OTHER PARTS OF FACE: ICD-10-CM

## 2022-02-03 PROCEDURE — 96900 ACTINOTHERAPY UV LIGHT: CPT

## 2022-02-08 ENCOUNTER — PDT (OUTPATIENT)
Dept: URBAN - NONMETROPOLITAN AREA CLINIC 2 | Facility: CLINIC | Age: 87
Setting detail: DERMATOLOGY
End: 2022-02-08

## 2022-02-08 PROCEDURE — 96900 ACTINOTHERAPY UV LIGHT: CPT

## 2022-02-10 ENCOUNTER — PDT (OUTPATIENT)
Dept: URBAN - NONMETROPOLITAN AREA CLINIC 2 | Facility: CLINIC | Age: 87
Setting detail: DERMATOLOGY
End: 2022-02-10

## 2022-02-10 DIAGNOSIS — C44.42 SQUAMOUS CELL CARCINOMA OF SKIN OF SCALP AND NECK: ICD-10-CM

## 2022-02-10 PROCEDURE — 96900 ACTINOTHERAPY UV LIGHT: CPT

## 2022-02-17 ENCOUNTER — PDT (OUTPATIENT)
Dept: URBAN - NONMETROPOLITAN AREA CLINIC 2 | Facility: CLINIC | Age: 87
Setting detail: DERMATOLOGY
End: 2022-02-17

## 2022-02-17 DIAGNOSIS — L82.0 INFLAMED SEBORRHEIC KERATOSIS: ICD-10-CM

## 2022-02-17 PROCEDURE — 96900 ACTINOTHERAPY UV LIGHT: CPT

## 2022-06-16 ENCOUNTER — PDT (OUTPATIENT)
Dept: URBAN - NONMETROPOLITAN AREA CLINIC 2 | Facility: CLINIC | Age: 87
Setting detail: DERMATOLOGY
End: 2022-06-16

## 2022-06-16 DIAGNOSIS — L29.8 OTHER PRURITUS: ICD-10-CM

## 2022-06-16 PROCEDURE — 96900 ACTINOTHERAPY UV LIGHT: CPT

## 2022-06-21 ENCOUNTER — PDT (OUTPATIENT)
Dept: URBAN - NONMETROPOLITAN AREA CLINIC 2 | Facility: CLINIC | Age: 87
Setting detail: DERMATOLOGY
End: 2022-06-21

## 2022-06-21 DIAGNOSIS — D48.5 NEOPLASM OF UNCERTAIN BEHAVIOR OF SKIN: ICD-10-CM

## 2022-06-21 PROCEDURE — 96900 ACTINOTHERAPY UV LIGHT: CPT

## 2022-06-28 ENCOUNTER — PDT (OUTPATIENT)
Dept: URBAN - NONMETROPOLITAN AREA CLINIC 2 | Facility: CLINIC | Age: 87
Setting detail: DERMATOLOGY
End: 2022-06-28

## 2022-06-28 DIAGNOSIS — L53.8 OTHER SPECIFIED ERYTHEMATOUS CONDITIONS: ICD-10-CM

## 2022-06-28 PROCEDURE — 96900 ACTINOTHERAPY UV LIGHT: CPT

## 2022-07-07 ENCOUNTER — PDT (OUTPATIENT)
Dept: URBAN - NONMETROPOLITAN AREA CLINIC 2 | Facility: CLINIC | Age: 87
Setting detail: DERMATOLOGY
End: 2022-07-07

## 2022-07-07 DIAGNOSIS — D48.5 NEOPLASM OF UNCERTAIN BEHAVIOR OF SKIN: ICD-10-CM

## 2022-07-07 DIAGNOSIS — L57.0 ACTINIC KERATOSIS: ICD-10-CM

## 2022-07-07 PROCEDURE — 96900 ACTINOTHERAPY UV LIGHT: CPT

## 2022-07-12 ENCOUNTER — PDT (OUTPATIENT)
Dept: URBAN - NONMETROPOLITAN AREA CLINIC 2 | Facility: CLINIC | Age: 87
Setting detail: DERMATOLOGY
End: 2022-07-12

## 2022-07-12 DIAGNOSIS — L82.1 OTHER SEBORRHEIC KERATOSIS: ICD-10-CM

## 2022-07-12 DIAGNOSIS — L57.8 OTHER SKIN CHANGES DUE TO CHRONIC EXPOSURE TO NONIONIZING RADIATION: ICD-10-CM

## 2022-07-12 DIAGNOSIS — D22.5 MELANOCYTIC NEVI OF TRUNK: ICD-10-CM

## 2022-07-12 PROCEDURE — 96900 ACTINOTHERAPY UV LIGHT: CPT

## 2022-07-14 ENCOUNTER — PDT (OUTPATIENT)
Dept: URBAN - NONMETROPOLITAN AREA CLINIC 2 | Facility: CLINIC | Age: 87
Setting detail: DERMATOLOGY
End: 2022-07-14

## 2022-07-14 DIAGNOSIS — C44.319 BASAL CELL CARCINOMA OF SKIN OF OTHER PARTS OF FACE: ICD-10-CM

## 2022-07-14 PROCEDURE — 96900 ACTINOTHERAPY UV LIGHT: CPT

## 2022-07-19 ENCOUNTER — PDT (OUTPATIENT)
Dept: URBAN - NONMETROPOLITAN AREA CLINIC 2 | Facility: CLINIC | Age: 87
Setting detail: DERMATOLOGY
End: 2022-07-19

## 2022-07-19 DIAGNOSIS — C44.41 BASAL CELL CARCINOMA OF SKIN OF SCALP AND NECK: ICD-10-CM

## 2022-07-19 PROCEDURE — 96900 ACTINOTHERAPY UV LIGHT: CPT

## 2022-07-26 ENCOUNTER — PDT (OUTPATIENT)
Dept: OTHER 1 | Age: 87
Setting detail: DERMATOLOGY
End: 2022-07-26

## 2022-07-26 DIAGNOSIS — L57.0 ACTINIC KERATOSIS: ICD-10-CM

## 2022-07-26 PROCEDURE — 96900 ACTINOTHERAPY UV LIGHT: CPT

## 2022-07-28 ENCOUNTER — PDT (OUTPATIENT)
Dept: OTHER 1 | Age: 87
Setting detail: DERMATOLOGY
End: 2022-07-28

## 2022-07-28 DIAGNOSIS — D48.5 NEOPLASM OF UNCERTAIN BEHAVIOR OF SKIN: ICD-10-CM

## 2022-07-28 DIAGNOSIS — D03.62 MELANOMA IN SITU OF LEFT UPPER LIMB, INCLUDING SHOULDER: ICD-10-CM

## 2022-07-28 PROCEDURE — 96900 ACTINOTHERAPY UV LIGHT: CPT

## 2022-08-02 ENCOUNTER — PDT (OUTPATIENT)
Dept: OTHER 1 | Age: 87
Setting detail: DERMATOLOGY
End: 2022-08-02

## 2022-08-02 DIAGNOSIS — D48.5 NEOPLASM OF UNCERTAIN BEHAVIOR OF SKIN: ICD-10-CM

## 2022-08-02 PROCEDURE — 96900 ACTINOTHERAPY UV LIGHT: CPT

## 2022-08-04 ENCOUNTER — PDT (OUTPATIENT)
Dept: OTHER 1 | Age: 87
Setting detail: DERMATOLOGY
End: 2022-08-04

## 2022-08-04 DIAGNOSIS — L40.0 PSORIASIS VULGARIS: ICD-10-CM

## 2022-08-04 PROCEDURE — 96900 ACTINOTHERAPY UV LIGHT: CPT

## 2022-08-09 ENCOUNTER — PDT (OUTPATIENT)
Dept: URBAN - NONMETROPOLITAN AREA CLINIC 30 | Facility: CLINIC | Age: 87
Setting detail: DERMATOLOGY
End: 2022-08-09

## 2022-08-09 DIAGNOSIS — D48.5 NEOPLASM OF UNCERTAIN BEHAVIOR OF SKIN: ICD-10-CM

## 2022-08-09 PROCEDURE — 96900 ACTINOTHERAPY UV LIGHT: CPT

## 2022-08-11 ENCOUNTER — PDT (OUTPATIENT)
Dept: URBAN - NONMETROPOLITAN AREA CLINIC 30 | Facility: CLINIC | Age: 87
Setting detail: DERMATOLOGY
End: 2022-08-11

## 2022-08-11 DIAGNOSIS — L63.8 OTHER ALOPECIA AREATA: ICD-10-CM

## 2022-08-11 PROCEDURE — 96900 ACTINOTHERAPY UV LIGHT: CPT

## 2022-08-16 ENCOUNTER — PDT (OUTPATIENT)
Dept: URBAN - NONMETROPOLITAN AREA CLINIC 30 | Facility: CLINIC | Age: 87
Setting detail: DERMATOLOGY
End: 2022-08-16

## 2022-08-16 DIAGNOSIS — L82.1 OTHER SEBORRHEIC KERATOSIS: ICD-10-CM

## 2022-08-16 DIAGNOSIS — L90.5 SCAR CONDITIONS AND FIBROSIS OF SKIN: ICD-10-CM

## 2022-08-16 DIAGNOSIS — L91.8 OTHER HYPERTROPHIC DISORDERS OF THE SKIN: ICD-10-CM

## 2022-08-16 DIAGNOSIS — L64.8 OTHER ANDROGENIC ALOPECIA: ICD-10-CM

## 2022-08-16 DIAGNOSIS — L81.4 OTHER MELANIN HYPERPIGMENTATION: ICD-10-CM

## 2022-08-16 DIAGNOSIS — D18.01 HEMANGIOMA OF SKIN AND SUBCUTANEOUS TISSUE: ICD-10-CM

## 2022-08-16 DIAGNOSIS — D22.5 MELANOCYTIC NEVI OF TRUNK: ICD-10-CM

## 2022-08-16 DIAGNOSIS — L21.8 OTHER SEBORRHEIC DERMATITIS: ICD-10-CM

## 2022-08-16 DIAGNOSIS — Z85.828 PERSONAL HISTORY OF OTHER MALIGNANT NEOPLASM OF SKIN: ICD-10-CM

## 2022-08-16 DIAGNOSIS — D22.9 MELANOCYTIC NEVI, UNSPECIFIED: ICD-10-CM

## 2022-08-16 PROCEDURE — 96900 ACTINOTHERAPY UV LIGHT: CPT

## 2022-09-08 ENCOUNTER — APPOINTMENT (OUTPATIENT)
Dept: URBAN - NONMETROPOLITAN AREA SURGERY 8 | Age: 87
Setting detail: DERMATOLOGY
End: 2022-09-09

## 2022-09-08 DIAGNOSIS — L40.0 PSORIASIS VULGARIS: ICD-10-CM

## 2022-09-08 PROCEDURE — OTHER PHOTOTHERAPY TREATMENT: OTHER

## 2022-09-08 PROCEDURE — 96900 ACTINOTHERAPY UV LIGHT: CPT

## 2022-09-08 ASSESSMENT — LOCATION SIMPLE DESCRIPTION DERM: LOCATION SIMPLE: ABDOMEN

## 2022-09-08 ASSESSMENT — LOCATION DETAILED DESCRIPTION DERM: LOCATION DETAILED: EPIGASTRIC SKIN

## 2022-09-08 ASSESSMENT — LOCATION ZONE DERM: LOCATION ZONE: TRUNK

## 2022-09-08 NOTE — HPI: PHOTOTHERAPY
Is This A New Presentation, Or A Follow-Up?: Phototherapy Treatment
When Was Your Last Phototherapy Treatment?: 08/30/2022

## 2022-09-08 NOTE — PROCEDURE: PHOTOTHERAPY TREATMENT
Protocol For Photochemotherapy: Baby Oil And Nbuvb: The patient received Photochemotherapy: Baby Oil and NBUVB (baby oil applied to all lesions prior to phototherapy).
Protocol For Photochemotherapy For Severe Photoresponsive Dermatoses: Petrolatum And Broad Band Uvb: The patient received Photochemotherapyfor severe photoresponsive dermatoses: Petrolatum and Broad Band UVB requiring at least 4 to 8 hours of care under direct physician supervision.
Protocol For Photochemotherapy For Severe Photoresponsive Dermatoses: Petrolatum And Nbuvb: The patient received Photochemotherapy for severe photoresponsive dermatoses: Petrolatum and NBUVB requiring at least 4 to 8 hours of care under direct physician supervision.
Protocol For Nb Uva: The patient received NB UVA.
Consent: Written consent obtained.  The risks were reviewed with the patient including but not limited to: burn, pigmentary changes, pain, blistering, scabbing, redness, increased risk of skin cancers, and the remote possibility of scarring.
Protocol For Nbuvb: The patient received NBUVB.
Protocol For Photochemotherapy: Tar And Broad Band Uvb (Goeckerman Treatment): The patient received Photochemotherapy: Tar and Broad Band UVB (Goeckerman treatment).
Protocol For Photochemotherapy: Petrolatum And Broad Band Uvb: The patient received Photochemotherapy: Petrolatum and Broad Band UVB.
Protocol For Photochemotherapy: Mineral Oil And Nbuvb: The patient received Photochemotherapy: Mineral Oil and NBUVB (mineral oil applied to all lesions prior to phototherapy).
Protocol For Puva: The patient received PUVA.
Detail Level: Zone
Protocol For Uva: The patient received UVA.
Render Post-Care In The Note: no
Protocol: NBUVB
Protocol For Photochemotherapy For Severe Photoresponsive Dermatoses: Puva: The patient received Photochemotherapy for severe photoresponsive dermatoses: PUVA requiring at least 4 to 8 hours of care under direct physician supervision.
Protocol For Photochemotherapy: Tar And Nbuvb (Goeckerman Treatment): The patient received Photochemotherapy: Tar and NBUVB (Goeckerman treatment).
Protocol For Photochemotherapy: Triamcinolone Ointment And Nbuvb: The patient received Photochemotherapy: Triamcinolone and NBUVB (triamcinolone ointment applied to all lesions prior to phototherapy).
Protocol For Uva1: The patient received UVA1.
Protocol For Photochemotherapy: Mineral Oil And Broad Band Uvb: The patient received Photochemotherapy: Mineral Oil and Broad Band UVB.
Total Treatment Time: 1.22
Protocol For Photochemotherapy For Severe Photoresponsive Dermatoses: Tar And Broad Band Uvb (Goeckerman Treatment): The patient received Photochemotherapy for severe photoresponsive dermatoses: Tar and Broad Band UVB (Goeckerman treatment) requiring at least 4 to 8 hours of care under direct physician supervision.
Protocol For Photochemotherapy For Severe Photoresponsive Dermatoses: Tar And Nbuvb (Goeckerman Treatment): The patient received Photochemotherapy for severe photoresponsive dermatoses: Tar and NBUVB (Goeckerman treatment) requiring at least 4 to 8 hours of care under direct physician supervision.
Protocol For Bath Puva: The patient received Bath PUVA.
Protocol For Protocol For Photochemotherapy For Severe Photoresponsive Dermatoses: Bath Puva: The patient received Photochemotherapy for severe photoresponsive dermatoses: Bath PUVA requiring at least 4 to 8 hours of care under direct physician supervision.
Skin Type: I
Protocol For Broad Band Uvb: The patient received Broad Band UVB.
Post-Care Instructions: I reviewed with the patient in detail post-care instructions. Patient is to wear sun protection. Patients may expect sunburn like redness, discomfort and scabbing.
Treatment Number: 27
Protocol For Photochemotherapy: Petrolatum And Nbuvb: The patient received Photochemotherapy: Petrolatum and NBUVB (petrolatum applied to all lesions prior to phototherapy).

## 2022-09-20 ENCOUNTER — APPOINTMENT (OUTPATIENT)
Dept: URBAN - NONMETROPOLITAN AREA SURGERY 8 | Age: 87
Setting detail: DERMATOLOGY
End: 2022-09-21

## 2022-09-20 DIAGNOSIS — L40.0 PSORIASIS VULGARIS: ICD-10-CM

## 2022-09-20 PROCEDURE — OTHER PHOTOTHERAPY TREATMENT: OTHER

## 2022-09-20 PROCEDURE — 96900 ACTINOTHERAPY UV LIGHT: CPT

## 2022-09-20 ASSESSMENT — LOCATION DETAILED DESCRIPTION DERM
LOCATION DETAILED: RIGHT PROXIMAL DORSAL FOREARM
LOCATION DETAILED: LEFT DISTAL POSTERIOR UPPER ARM
LOCATION DETAILED: PERIUMBILICAL SKIN
LOCATION DETAILED: LEFT PROXIMAL LATERAL CALF
LOCATION DETAILED: RIGHT PROXIMAL PRETIBIAL REGION
LOCATION DETAILED: RIGHT DISTAL LATERAL CALF
LOCATION DETAILED: LEFT LATERAL SUPERIOR CHEST

## 2022-09-20 ASSESSMENT — LOCATION SIMPLE DESCRIPTION DERM
LOCATION SIMPLE: LEFT UPPER ARM
LOCATION SIMPLE: CHEST
LOCATION SIMPLE: ABDOMEN
LOCATION SIMPLE: LEFT CALF
LOCATION SIMPLE: RIGHT FOREARM
LOCATION SIMPLE: RIGHT PRETIBIAL REGION
LOCATION SIMPLE: RIGHT CALF

## 2022-09-20 ASSESSMENT — LOCATION ZONE DERM
LOCATION ZONE: ARM
LOCATION ZONE: TRUNK
LOCATION ZONE: LEG

## 2022-09-20 NOTE — PROCEDURE: PHOTOTHERAPY TREATMENT
Protocol For Photochemotherapy For Severe Photoresponsive Dermatoses: Tar And Nbuvb (Goeckerman Treatment): The patient received Photochemotherapy for severe photoresponsive dermatoses: Tar and NBUVB (Goeckerman treatment) requiring at least 4 to 8 hours of care under direct physician supervision.
Protocol For Photochemotherapy For Severe Photoresponsive Dermatoses: Tar And Broad Band Uvb (Goeckerman Treatment): The patient received Photochemotherapy for severe photoresponsive dermatoses: Tar and Broad Band UVB (Goeckerman treatment) requiring at least 4 to 8 hours of care under direct physician supervision.
Protocol: NBUVB
Protocol For Broad Band Uvb: The patient received Broad Band UVB.
Protocol For Protocol For Photochemotherapy For Severe Photoresponsive Dermatoses: Bath Puva: The patient received Photochemotherapy for severe photoresponsive dermatoses: Bath PUVA requiring at least 4 to 8 hours of care under direct physician supervision.
Total Treatment Time: 1.27
Protocol For Photochemotherapy For Severe Photoresponsive Dermatoses: Petrolatum And Broad Band Uvb: The patient received Photochemotherapyfor severe photoresponsive dermatoses: Petrolatum and Broad Band UVB requiring at least 4 to 8 hours of care under direct physician supervision.
Detail Level: Zone
Protocol For Photochemotherapy: Petrolatum And Nbuvb: The patient received Photochemotherapy: Petrolatum and NBUVB (petrolatum applied to all lesions prior to phototherapy).
Protocol For Photochemotherapy: Baby Oil And Nbuvb: The patient received Photochemotherapy: Baby Oil and NBUVB (baby oil applied to all lesions prior to phototherapy).
Protocol For Photochemotherapy: Tar And Broad Band Uvb (Goeckerman Treatment): The patient received Photochemotherapy: Tar and Broad Band UVB (Goeckerman treatment).
Consent: Written consent obtained.  The risks were reviewed with the patient including but not limited to: burn, pigmentary changes, pain, blistering, scabbing, redness, increased risk of skin cancers, and the remote possibility of scarring.
Protocol For Photochemotherapy For Severe Photoresponsive Dermatoses: Petrolatum And Nbuvb: The patient received Photochemotherapy for severe photoresponsive dermatoses: Petrolatum and NBUVB requiring at least 4 to 8 hours of care under direct physician supervision.
Protocol For Nb Uva: The patient received NB UVA.
Treatment Number: 28
Protocol For Puva: The patient received PUVA.
Protocol For Photochemotherapy: Petrolatum And Broad Band Uvb: The patient received Photochemotherapy: Petrolatum and Broad Band UVB.
Skin Type: I
Protocol For Photochemotherapy: Mineral Oil And Nbuvb: The patient received Photochemotherapy: Mineral Oil and NBUVB (mineral oil applied to all lesions prior to phototherapy).
Protocol For Nbuvb: The patient received NBUVB.
Render Post-Care In The Note: no
Protocol For Photochemotherapy For Severe Photoresponsive Dermatoses: Puva: The patient received Photochemotherapy for severe photoresponsive dermatoses: PUVA requiring at least 4 to 8 hours of care under direct physician supervision.
Protocol For Uva: The patient received UVA.
Total Body Energy: 325
Protocol For Photochemotherapy: Triamcinolone Ointment And Nbuvb: The patient received Photochemotherapy: Triamcinolone and NBUVB (triamcinolone ointment applied to all lesions prior to phototherapy).
Protocol For Bath Puva: The patient received Bath PUVA.
Protocol For Photochemotherapy: Mineral Oil And Broad Band Uvb: The patient received Photochemotherapy: Mineral Oil and Broad Band UVB.
Protocol For Uva1: The patient received UVA1.
Post-Care Instructions: I reviewed with the patient in detail post-care instructions. Patient is to wear sun protection. Patients may expect sunburn like redness, discomfort and scabbing.
Protocol For Photochemotherapy: Tar And Nbuvb (Goeckerman Treatment): The patient received Photochemotherapy: Tar and NBUVB (Goeckerman treatment).

## 2022-09-22 ENCOUNTER — APPOINTMENT (OUTPATIENT)
Dept: URBAN - NONMETROPOLITAN AREA SURGERY 8 | Age: 87
Setting detail: DERMATOLOGY
End: 2022-09-22

## 2022-09-22 DIAGNOSIS — L40.0 PSORIASIS VULGARIS: ICD-10-CM

## 2022-09-22 PROCEDURE — OTHER PHOTOTHERAPY TREATMENT: OTHER

## 2022-09-22 PROCEDURE — 96900 ACTINOTHERAPY UV LIGHT: CPT

## 2022-09-22 ASSESSMENT — LOCATION DETAILED DESCRIPTION DERM
LOCATION DETAILED: LEFT ANTERIOR SHOULDER
LOCATION DETAILED: RIGHT ANTERIOR PROXIMAL THIGH
LOCATION DETAILED: LEFT PROXIMAL PRETIBIAL REGION
LOCATION DETAILED: PERIUMBILICAL SKIN
LOCATION DETAILED: RIGHT CLAVICULAR SKIN
LOCATION DETAILED: RIGHT PROXIMAL PRETIBIAL REGION
LOCATION DETAILED: LEFT ANTERIOR PROXIMAL THIGH

## 2022-09-22 ASSESSMENT — LOCATION SIMPLE DESCRIPTION DERM
LOCATION SIMPLE: RIGHT CLAVICULAR SKIN
LOCATION SIMPLE: RIGHT PRETIBIAL REGION
LOCATION SIMPLE: LEFT PRETIBIAL REGION
LOCATION SIMPLE: LEFT SHOULDER
LOCATION SIMPLE: RIGHT THIGH
LOCATION SIMPLE: LEFT THIGH
LOCATION SIMPLE: ABDOMEN

## 2022-09-22 ASSESSMENT — LOCATION ZONE DERM
LOCATION ZONE: LEG
LOCATION ZONE: TRUNK
LOCATION ZONE: ARM

## 2022-09-22 NOTE — HPI: PHOTOTHERAPY
Is This A New Presentation, Or A Follow-Up?: Phototherapy Treatment
When Was Your Last Phototherapy Treatment?: 09/20/2022

## 2022-09-22 NOTE — PROCEDURE: PHOTOTHERAPY TREATMENT
Protocol For Photochemotherapy: Triamcinolone Ointment And Nbuvb: The patient received Photochemotherapy: Triamcinolone and NBUVB (triamcinolone ointment applied to all lesions prior to phototherapy).
Protocol For Uva: The patient received UVA.
Protocol For Nbuvb: Hands/Feet: The patient received NBUVB.
Protocol For Photochemotherapy: Petrolatum And Broad Band Uvb: The patient received Photochemotherapy: Petrolatum and Broad Band UVB.
Total Body Energy: 345
Protocol For Photochemotherapy For Severe Photoresponsive Dermatoses: Puva: The patient received Photochemotherapy for severe photoresponsive dermatoses: PUVA requiring at least 4 to 8 hours of care under direct physician supervision.
Protocol For Photochemotherapy: Mineral Oil And Nbuvb: The patient received Photochemotherapy: Mineral Oil and NBUVB (mineral oil applied to all lesions prior to phototherapy).
Render Post-Care In The Note: no
Post-Care Instructions: I reviewed with the patient in detail post-care instructions. Patient is to wear sun protection. Patients may expect sunburn like redness, discomfort and scabbing.
Protocol For Photochemotherapy For Severe Photoresponsive Dermatoses: Tar And Broad Band Uvb (Goeckerman Treatment): The patient received Photochemotherapy for severe photoresponsive dermatoses: Tar and Broad Band UVB (Goeckerman treatment) requiring at least 4 to 8 hours of care under direct physician supervision.
Protocol For Photochemotherapy: Tar And Nbuvb (Goeckerman Treatment): The patient received Photochemotherapy: Tar and NBUVB (Goeckerman treatment).
Protocol For Uva1: The patient received UVA1.
Protocol: NBUVB
Protocol For Photochemotherapy: Mineral Oil And Broad Band Uvb: The patient received Photochemotherapy: Mineral Oil and Broad Band UVB.
Protocol For Bath Puva: The patient received Bath PUVA.
Protocol For Photochemotherapy For Severe Photoresponsive Dermatoses: Petrolatum And Broad Band Uvb: The patient received Photochemotherapyfor severe photoresponsive dermatoses: Petrolatum and Broad Band UVB requiring at least 4 to 8 hours of care under direct physician supervision.
Protocol For Photochemotherapy For Severe Photoresponsive Dermatoses: Petrolatum And Nbuvb: The patient received Photochemotherapy for severe photoresponsive dermatoses: Petrolatum and NBUVB requiring at least 4 to 8 hours of care under direct physician supervision.
Total Treatment Time: 1.01
Protocol For Photochemotherapy: Petrolatum And Nbuvb: The patient received Photochemotherapy: Petrolatum and NBUVB (petrolatum applied to all lesions prior to phototherapy).
Protocol For Protocol For Photochemotherapy For Severe Photoresponsive Dermatoses: Bath Puva: The patient received Photochemotherapy for severe photoresponsive dermatoses: Bath PUVA requiring at least 4 to 8 hours of care under direct physician supervision.
Protocol For Photochemotherapy For Severe Photoresponsive Dermatoses: Tar And Nbuvb (Goeckerman Treatment): The patient received Photochemotherapy for severe photoresponsive dermatoses: Tar and NBUVB (Goeckerman treatment) requiring at least 4 to 8 hours of care under direct physician supervision.
Detail Level: Zone
Protocol For Broad Band Uvb: The patient received Broad Band UVB.
Skin Type: I
Treatment Number: 29
Protocol For Puva: The patient received PUVA.
Protocol For Nb Uva: The patient received NB UVA.
Consent: Written consent obtained.  The risks were reviewed with the patient including but not limited to: burn, pigmentary changes, pain, blistering, scabbing, redness, increased risk of skin cancers, and the remote possibility of scarring.
Protocol For Photochemotherapy: Tar And Broad Band Uvb (Goeckerman Treatment): The patient received Photochemotherapy: Tar and Broad Band UVB (Goeckerman treatment).
Protocol For Photochemotherapy: Baby Oil And Nbuvb: The patient received Photochemotherapy: Baby Oil and NBUVB (baby oil applied to all lesions prior to phototherapy).

## 2022-09-29 ENCOUNTER — APPOINTMENT (OUTPATIENT)
Dept: URBAN - NONMETROPOLITAN AREA SURGERY 8 | Age: 87
Setting detail: DERMATOLOGY
End: 2022-09-29

## 2022-09-29 DIAGNOSIS — L40.0 PSORIASIS VULGARIS: ICD-10-CM

## 2022-09-29 PROCEDURE — OTHER PHOTOTHERAPY TREATMENT: OTHER

## 2022-09-29 PROCEDURE — 96900 ACTINOTHERAPY UV LIGHT: CPT

## 2022-09-29 ASSESSMENT — LOCATION ZONE DERM
LOCATION ZONE: TRUNK
LOCATION ZONE: LEG

## 2022-09-29 ASSESSMENT — LOCATION SIMPLE DESCRIPTION DERM
LOCATION SIMPLE: LEFT THIGH
LOCATION SIMPLE: ABDOMEN
LOCATION SIMPLE: RIGHT THIGH

## 2022-09-29 ASSESSMENT — LOCATION DETAILED DESCRIPTION DERM
LOCATION DETAILED: PERIUMBILICAL SKIN
LOCATION DETAILED: RIGHT ANTERIOR PROXIMAL THIGH
LOCATION DETAILED: RIGHT LATERAL ABDOMEN
LOCATION DETAILED: LEFT ANTERIOR PROXIMAL THIGH

## 2022-09-29 NOTE — PROCEDURE: PHOTOTHERAPY TREATMENT
Protocol For Photochemotherapy: Mineral Oil And Broad Band Uvb: The patient received Photochemotherapy: Mineral Oil and Broad Band UVB.
Protocol For Bath Puva: The patient received Bath PUVA.
Protocol For Photochemotherapy: Triamcinolone Ointment And Nbuvb: The patient received Photochemotherapy: Triamcinolone and NBUVB (triamcinolone ointment applied to all lesions prior to phototherapy).
Protocol For Photochemotherapy: Tar And Nbuvb (Goeckerman Treatment): The patient received Photochemotherapy: Tar and NBUVB (Goeckerman treatment).
Protocol: NBUVB
Protocol For Uva1: The patient received UVA1.
Protocol For Protocol For Photochemotherapy For Severe Photoresponsive Dermatoses: Bath Puva: The patient received Photochemotherapy for severe photoresponsive dermatoses: Bath PUVA requiring at least 4 to 8 hours of care under direct physician supervision.
Protocol For Photochemotherapy For Severe Photoresponsive Dermatoses: Tar And Nbuvb (Goeckerman Treatment): The patient received Photochemotherapy for severe photoresponsive dermatoses: Tar and NBUVB (Goeckerman treatment) requiring at least 4 to 8 hours of care under direct physician supervision.
Protocol For Broad Band Uvb: The patient received Broad Band UVB.
Post-Care Instructions: I reviewed with the patient in detail post-care instructions. Patient is to wear sun protection. Patients may expect sunburn like redness, discomfort and scabbing.
Protocol For Photochemotherapy For Severe Photoresponsive Dermatoses: Tar And Broad Band Uvb (Goeckerman Treatment): The patient received Photochemotherapy for severe photoresponsive dermatoses: Tar and Broad Band UVB (Goeckerman treatment) requiring at least 4 to 8 hours of care under direct physician supervision.
Total Treatment Time: 1.08
Detail Level: Zone
Protocol For Photochemotherapy: Petrolatum And Nbuvb: The patient received Photochemotherapy: Petrolatum and NBUVB (petrolatum applied to all lesions prior to phototherapy).
Protocol For Photochemotherapy: Tar And Broad Band Uvb (Goeckerman Treatment): The patient received Photochemotherapy: Tar and Broad Band UVB (Goeckerman treatment).
Protocol For Photochemotherapy: Baby Oil And Nbuvb: The patient received Photochemotherapy: Baby Oil and NBUVB (baby oil applied to all lesions prior to phototherapy).
Protocol For Photochemotherapy For Severe Photoresponsive Dermatoses: Petrolatum And Broad Band Uvb: The patient received Photochemotherapyfor severe photoresponsive dermatoses: Petrolatum and Broad Band UVB requiring at least 4 to 8 hours of care under direct physician supervision.
Protocol For Photochemotherapy For Severe Photoresponsive Dermatoses: Petrolatum And Nbuvb: The patient received Photochemotherapy for severe photoresponsive dermatoses: Petrolatum and NBUVB requiring at least 4 to 8 hours of care under direct physician supervision.
Protocol For Puva: The patient received PUVA.
Treatment Number: 30
Protocol For Nb Uva: The patient received NB UVA.
Consent: Written consent obtained.  The risks were reviewed with the patient including but not limited to: burn, pigmentary changes, pain, blistering, scabbing, redness, increased risk of skin cancers, and the remote possibility of scarring.
Protocol For Photochemotherapy: Mineral Oil And Nbuvb: The patient received Photochemotherapy: Mineral Oil and NBUVB (mineral oil applied to all lesions prior to phototherapy).
Render Post-Care In The Note: no
Protocol For Nbuvb: The patient received NBUVB.
Protocol For Photochemotherapy: Petrolatum And Broad Band Uvb: The patient received Photochemotherapy: Petrolatum and Broad Band UVB.
Protocol For Uva: The patient received UVA.
Total Body Energy: 360
Protocol For Photochemotherapy For Severe Photoresponsive Dermatoses: Puva: The patient received Photochemotherapy for severe photoresponsive dermatoses: PUVA requiring at least 4 to 8 hours of care under direct physician supervision.

## 2022-10-04 ENCOUNTER — APPOINTMENT (OUTPATIENT)
Dept: URBAN - NONMETROPOLITAN AREA SURGERY 8 | Age: 87
Setting detail: DERMATOLOGY
End: 2022-10-04

## 2022-10-04 DIAGNOSIS — L40.0 PSORIASIS VULGARIS: ICD-10-CM

## 2022-10-04 PROCEDURE — 96900 ACTINOTHERAPY UV LIGHT: CPT

## 2022-10-04 PROCEDURE — OTHER PHOTOTHERAPY TREATMENT: OTHER

## 2022-10-04 ASSESSMENT — LOCATION SIMPLE DESCRIPTION DERM
LOCATION SIMPLE: RIGHT THIGH
LOCATION SIMPLE: CHEST
LOCATION SIMPLE: LEFT THIGH
LOCATION SIMPLE: LEFT PRETIBIAL REGION
LOCATION SIMPLE: RIGHT PRETIBIAL REGION
LOCATION SIMPLE: ABDOMEN

## 2022-10-04 ASSESSMENT — LOCATION DETAILED DESCRIPTION DERM
LOCATION DETAILED: PERIUMBILICAL SKIN
LOCATION DETAILED: RIGHT PROXIMAL PRETIBIAL REGION
LOCATION DETAILED: RIGHT ANTERIOR DISTAL THIGH
LOCATION DETAILED: LEFT ANTERIOR DISTAL THIGH
LOCATION DETAILED: LEFT PROXIMAL PRETIBIAL REGION
LOCATION DETAILED: LEFT LATERAL SUPERIOR CHEST
LOCATION DETAILED: LEFT LATERAL ABDOMEN
LOCATION DETAILED: RIGHT LATERAL INFERIOR CHEST

## 2022-10-04 ASSESSMENT — LOCATION ZONE DERM
LOCATION ZONE: LEG
LOCATION ZONE: TRUNK

## 2022-10-04 NOTE — HPI: PHOTOTHERAPY
Is This A New Presentation, Or A Follow-Up?: Phototherapy Treatment
When Was Your Last Phototherapy Treatment?: 09/29/2022

## 2022-10-04 NOTE — PROCEDURE: PHOTOTHERAPY TREATMENT
Protocol For Broad Band Uvb: The patient received Broad Band UVB.
Protocol For Photochemotherapy: Baby Oil And Nbuvb: The patient received Photochemotherapy: Baby Oil and NBUVB (baby oil applied to all lesions prior to phototherapy).
Treatment Number: 31
Protocol For Nbuvb: The patient received NBUVB.
Protocol For Photochemotherapy For Severe Photoresponsive Dermatoses: Petrolatum And Broad Band Uvb: The patient received Photochemotherapyfor severe photoresponsive dermatoses: Petrolatum and Broad Band UVB requiring at least 4 to 8 hours of care under direct physician supervision.
Protocol For Photochemotherapy For Severe Photoresponsive Dermatoses: Petrolatum And Nbuvb: The patient received Photochemotherapy for severe photoresponsive dermatoses: Petrolatum and NBUVB requiring at least 4 to 8 hours of care under direct physician supervision.
Consent: Written consent obtained.  The risks were reviewed with the patient including but not limited to: burn, pigmentary changes, pain, blistering, scabbing, redness, increased risk of skin cancers, and the remote possibility of scarring.
Protocol For Photochemotherapy: Tar And Broad Band Uvb (Goeckerman Treatment): The patient received Photochemotherapy: Tar and Broad Band UVB (Goeckerman treatment).
Skin Type: I
Total Body Energy: 375
Protocol For Photochemotherapy: Mineral Oil And Nbuvb: The patient received Photochemotherapy: Mineral Oil and NBUVB (mineral oil applied to all lesions prior to phototherapy).
Protocol For Puva: The patient received PUVA.
Protocol For Nb Uva: The patient received NB UVA.
Protocol For Photochemotherapy: Petrolatum And Broad Band Uvb: The patient received Photochemotherapy: Petrolatum and Broad Band UVB.
Protocol For Photochemotherapy: Triamcinolone Ointment And Nbuvb: The patient received Photochemotherapy: Triamcinolone and NBUVB (triamcinolone ointment applied to all lesions prior to phototherapy).
Render Post-Care In The Note: no
Protocol For Photochemotherapy For Severe Photoresponsive Dermatoses: Puva: The patient received Photochemotherapy for severe photoresponsive dermatoses: PUVA requiring at least 4 to 8 hours of care under direct physician supervision.
Protocol For Uva: The patient received UVA.
Protocol: NBUVB
Protocol For Photochemotherapy: Tar And Nbuvb (Goeckerman Treatment): The patient received Photochemotherapy: Tar and NBUVB (Goeckerman treatment).
Detail Level: Zone
Protocol For Photochemotherapy: Mineral Oil And Broad Band Uvb: The patient received Photochemotherapy: Mineral Oil and Broad Band UVB.
Protocol For Bath Puva: The patient received Bath PUVA.
Protocol For Uva1: The patient received UVA1.
Protocol For Photochemotherapy: Petrolatum And Nbuvb: The patient received Photochemotherapy: Petrolatum and NBUVB (petrolatum applied to all lesions prior to phototherapy).
Total Treatment Time: 1.07
Protocol For Photochemotherapy For Severe Photoresponsive Dermatoses: Tar And Nbuvb (Goeckerman Treatment): The patient received Photochemotherapy for severe photoresponsive dermatoses: Tar and NBUVB (Goeckerman treatment) requiring at least 4 to 8 hours of care under direct physician supervision.
Post-Care Instructions: I reviewed with the patient in detail post-care instructions. Patient is to wear sun protection. Patients may expect sunburn like redness, discomfort and scabbing.
Protocol For Photochemotherapy For Severe Photoresponsive Dermatoses: Tar And Broad Band Uvb (Goeckerman Treatment): The patient received Photochemotherapy for severe photoresponsive dermatoses: Tar and Broad Band UVB (Goeckerman treatment) requiring at least 4 to 8 hours of care under direct physician supervision.
Protocol For Protocol For Photochemotherapy For Severe Photoresponsive Dermatoses: Bath Puva: The patient received Photochemotherapy for severe photoresponsive dermatoses: Bath PUVA requiring at least 4 to 8 hours of care under direct physician supervision.

## 2022-10-06 ENCOUNTER — APPOINTMENT (OUTPATIENT)
Dept: URBAN - NONMETROPOLITAN AREA SURGERY 8 | Age: 87
Setting detail: DERMATOLOGY
End: 2022-10-11

## 2022-10-06 DIAGNOSIS — L40.0 PSORIASIS VULGARIS: ICD-10-CM

## 2022-10-06 PROCEDURE — OTHER PHOTOTHERAPY TREATMENT: OTHER

## 2022-10-06 PROCEDURE — 96900 ACTINOTHERAPY UV LIGHT: CPT

## 2022-10-06 ASSESSMENT — LOCATION DETAILED DESCRIPTION DERM: LOCATION DETAILED: LEFT MEDIAL INFERIOR CHEST

## 2022-10-06 ASSESSMENT — LOCATION ZONE DERM: LOCATION ZONE: TRUNK

## 2022-10-06 ASSESSMENT — LOCATION SIMPLE DESCRIPTION DERM: LOCATION SIMPLE: CHEST

## 2022-10-06 NOTE — HPI: PHOTOTHERAPY
Is This A New Presentation, Or A Follow-Up?: Phototherapy Treatment
When Was Your Last Phototherapy Treatment?: 10/04/2022

## 2022-10-06 NOTE — PROCEDURE: PHOTOTHERAPY TREATMENT
Render Post-Care In The Note: no
Treatment Number: 32
Protocol For Photochemotherapy: Petrolatum And Broad Band Uvb: The patient received Photochemotherapy: Petrolatum and Broad Band UVB.
Protocol For Photochemotherapy: Tar And Broad Band Uvb (Goeckerman Treatment): The patient received Photochemotherapy: Tar and Broad Band UVB (Goeckerman treatment).
Protocol For Puva: The patient received PUVA.
Skin Type: I
Protocol For Nb Uva: The patient received NB UVA.
Protocol For Nbuvb: The patient received NBUVB.
Protocol For Photochemotherapy: Mineral Oil And Broad Band Uvb: The patient received Photochemotherapy: Mineral Oil and Broad Band UVB.
Protocol For Bath Puva: The patient received Bath PUVA.
Protocol For Photochemotherapy: Triamcinolone Ointment And Nbuvb: The patient received Photochemotherapy: Triamcinolone and NBUVB (triamcinolone ointment applied to all lesions prior to phototherapy).
Total Body Energy: 390
Protocol For Uva1: The patient received UVA1.
Protocol For Photochemotherapy: Mineral Oil And Nbuvb: The patient received Photochemotherapy: Mineral Oil and NBUVB (mineral oil applied to all lesions prior to phototherapy).
Protocol For Photochemotherapy For Severe Photoresponsive Dermatoses: Puva: The patient received Photochemotherapy for severe photoresponsive dermatoses: PUVA requiring at least 4 to 8 hours of care under direct physician supervision.
Protocol For Uva: The patient received UVA.
Protocol For Photochemotherapy For Severe Photoresponsive Dermatoses: Tar And Broad Band Uvb (Goeckerman Treatment): The patient received Photochemotherapy for severe photoresponsive dermatoses: Tar and Broad Band UVB (Goeckerman treatment) requiring at least 4 to 8 hours of care under direct physician supervision.
Protocol For Photochemotherapy For Severe Photoresponsive Dermatoses: Tar And Nbuvb (Goeckerman Treatment): The patient received Photochemotherapy for severe photoresponsive dermatoses: Tar and NBUVB (Goeckerman treatment) requiring at least 4 to 8 hours of care under direct physician supervision.
Protocol For Broad Band Uvb: The patient received Broad Band UVB.
Protocol For Photochemotherapy: Petrolatum And Nbuvb: The patient received Photochemotherapy: Petrolatum and NBUVB (petrolatum applied to all lesions prior to phototherapy).
Protocol For Photochemotherapy: Tar And Nbuvb (Goeckerman Treatment): The patient received Photochemotherapy: Tar and NBUVB (Goeckerman treatment).
Detail Level: Generalized
Post-Care Instructions: I reviewed with the patient in detail post-care instructions. Patient is to wear sun protection. Patients may expect sunburn like redness, discomfort and scabbing.
Protocol: NBUVB
Protocol For Photochemotherapy For Severe Photoresponsive Dermatoses: Petrolatum And Nbuvb: The patient received Photochemotherapy for severe photoresponsive dermatoses: Petrolatum and NBUVB requiring at least 4 to 8 hours of care under direct physician supervision.
Protocol For Photochemotherapy: Baby Oil And Nbuvb: The patient received Photochemotherapy: Baby Oil and NBUVB (baby oil applied to all lesions prior to phototherapy).
Consent: Written consent obtained.  The risks were reviewed with the patient including but not limited to: burn, pigmentary changes, pain, blistering, scabbing, redness, increased risk of skin cancers, and the remote possibility of scarring.
Protocol For Protocol For Photochemotherapy For Severe Photoresponsive Dermatoses: Bath Puva: The patient received Photochemotherapy for severe photoresponsive dermatoses: Bath PUVA requiring at least 4 to 8 hours of care under direct physician supervision.
Total Treatment Time: 1.54
Protocol For Photochemotherapy For Severe Photoresponsive Dermatoses: Petrolatum And Broad Band Uvb: The patient received Photochemotherapyfor severe photoresponsive dermatoses: Petrolatum and Broad Band UVB requiring at least 4 to 8 hours of care under direct physician supervision.

## 2022-10-11 ENCOUNTER — APPOINTMENT (OUTPATIENT)
Dept: URBAN - NONMETROPOLITAN AREA SURGERY 8 | Age: 87
Setting detail: DERMATOLOGY
End: 2022-10-11

## 2022-10-11 DIAGNOSIS — L40.0 PSORIASIS VULGARIS: ICD-10-CM

## 2022-10-11 PROCEDURE — OTHER COUNSELING: OTHER

## 2022-10-11 PROCEDURE — 96900 ACTINOTHERAPY UV LIGHT: CPT

## 2022-10-11 PROCEDURE — OTHER PHOTOTHERAPY TREATMENT: OTHER

## 2022-10-11 ASSESSMENT — LOCATION DETAILED DESCRIPTION DERM: LOCATION DETAILED: RIGHT MEDIAL SUPERIOR CHEST

## 2022-10-11 ASSESSMENT — LOCATION SIMPLE DESCRIPTION DERM: LOCATION SIMPLE: CHEST

## 2022-10-11 ASSESSMENT — LOCATION ZONE DERM: LOCATION ZONE: TRUNK

## 2022-10-11 NOTE — HPI: PHOTOTHERAPY
Is This A New Presentation, Or A Follow-Up?: Phototherapy Treatment
When Was Your Last Phototherapy Treatment?: 10/06/2022

## 2022-10-11 NOTE — PROCEDURE: PHOTOTHERAPY TREATMENT
Protocol For Uva1: The patient received UVA1.
Protocol For Bath Puva: The patient received Bath PUVA.
Protocol: NBUVB
Protocol For Photochemotherapy: Tar And Nbuvb (Goeckerman Treatment): The patient received Photochemotherapy: Tar and NBUVB (Goeckerman treatment).
Protocol For Photochemotherapy For Severe Photoresponsive Dermatoses: Tar And Broad Band Uvb (Goeckerman Treatment): The patient received Photochemotherapy for severe photoresponsive dermatoses: Tar and Broad Band UVB (Goeckerman treatment) requiring at least 4 to 8 hours of care under direct physician supervision.
Post-Care Instructions: I reviewed with the patient in detail post-care instructions. Patient is to wear sun protection. Patients may expect sunburn like redness, discomfort and scabbing.
Protocol For Photochemotherapy For Severe Photoresponsive Dermatoses: Tar And Nbuvb (Goeckerman Treatment): The patient received Photochemotherapy for severe photoresponsive dermatoses: Tar and NBUVB (Goeckerman treatment) requiring at least 4 to 8 hours of care under direct physician supervision.
Protocol For Photochemotherapy: Petrolatum And Nbuvb: The patient received Photochemotherapy: Petrolatum and NBUVB (petrolatum applied to all lesions prior to phototherapy).
Total Treatment Time: 1.14
Detail Level: Generalized
Protocol For Photochemotherapy For Severe Photoresponsive Dermatoses: Petrolatum And Nbuvb: The patient received Photochemotherapy for severe photoresponsive dermatoses: Petrolatum and NBUVB requiring at least 4 to 8 hours of care under direct physician supervision.
Protocol For Protocol For Photochemotherapy For Severe Photoresponsive Dermatoses: Bath Puva: The patient received Photochemotherapy for severe photoresponsive dermatoses: Bath PUVA requiring at least 4 to 8 hours of care under direct physician supervision.
Protocol For Broad Band Uvb: The patient received Broad Band UVB.
Consent: Written consent obtained.  The risks were reviewed with the patient including but not limited to: burn, pigmentary changes, pain, blistering, scabbing, redness, increased risk of skin cancers, and the remote possibility of scarring.
Protocol For Photochemotherapy For Severe Photoresponsive Dermatoses: Petrolatum And Broad Band Uvb: The patient received Photochemotherapyfor severe photoresponsive dermatoses: Petrolatum and Broad Band UVB requiring at least 4 to 8 hours of care under direct physician supervision.
Protocol For Nb Uva: The patient received NB UVA.
Treatment Number: 33
Protocol For Nbuvb: The patient received NBUVB.
Protocol For Photochemotherapy: Tar And Broad Band Uvb (Goeckerman Treatment): The patient received Photochemotherapy: Tar and Broad Band UVB (Goeckerman treatment).
Protocol For Photochemotherapy: Baby Oil And Nbuvb: The patient received Photochemotherapy: Baby Oil and NBUVB (baby oil applied to all lesions prior to phototherapy).
Protocol For Photochemotherapy: Petrolatum And Broad Band Uvb: The patient received Photochemotherapy: Petrolatum and Broad Band UVB.
Skin Type: I
Protocol For Puva: The patient received PUVA.
Render Post-Care In The Note: no
Total Body Energy: 405
Protocol For Photochemotherapy For Severe Photoresponsive Dermatoses: Puva: The patient received Photochemotherapy for severe photoresponsive dermatoses: PUVA requiring at least 4 to 8 hours of care under direct physician supervision.
Protocol For Photochemotherapy: Mineral Oil And Nbuvb: The patient received Photochemotherapy: Mineral Oil and NBUVB (mineral oil applied to all lesions prior to phototherapy).
Protocol For Photochemotherapy: Triamcinolone Ointment And Nbuvb: The patient received Photochemotherapy: Triamcinolone and NBUVB (triamcinolone ointment applied to all lesions prior to phototherapy).
Protocol For Uva: The patient received UVA.
Protocol For Photochemotherapy: Mineral Oil And Broad Band Uvb: The patient received Photochemotherapy: Mineral Oil and Broad Band UVB.

## 2022-10-18 ENCOUNTER — APPOINTMENT (OUTPATIENT)
Dept: URBAN - NONMETROPOLITAN AREA SURGERY 8 | Age: 87
Setting detail: DERMATOLOGY
End: 2022-10-18

## 2022-10-18 DIAGNOSIS — L40.0 PSORIASIS VULGARIS: ICD-10-CM

## 2022-10-18 PROCEDURE — 96900 ACTINOTHERAPY UV LIGHT: CPT

## 2022-10-18 PROCEDURE — OTHER PHOTOTHERAPY TREATMENT: OTHER

## 2022-10-18 PROCEDURE — OTHER COUNSELING: OTHER

## 2022-10-18 ASSESSMENT — LOCATION DETAILED DESCRIPTION DERM: LOCATION DETAILED: EPIGASTRIC SKIN

## 2022-10-18 ASSESSMENT — LOCATION ZONE DERM: LOCATION ZONE: TRUNK

## 2022-10-18 ASSESSMENT — LOCATION SIMPLE DESCRIPTION DERM: LOCATION SIMPLE: ABDOMEN

## 2022-10-18 NOTE — PROCEDURE: PHOTOTHERAPY TREATMENT
Total Body Energy: 420
Protocol For Nbuvb: Hands/Feet: The patient received NBUVB.
Protocol For Photochemotherapy For Severe Photoresponsive Dermatoses: Puva: The patient received Photochemotherapy for severe photoresponsive dermatoses: PUVA requiring at least 4 to 8 hours of care under direct physician supervision.
Render Post-Care In The Note: no
Protocol For Uva: The patient received UVA.
Protocol For Photochemotherapy: Mineral Oil And Broad Band Uvb: The patient received Photochemotherapy: Mineral Oil and Broad Band UVB.
Protocol For Photochemotherapy: Triamcinolone Ointment And Nbuvb: The patient received Photochemotherapy: Triamcinolone and NBUVB (triamcinolone ointment applied to all lesions prior to phototherapy).
Protocol For Photochemotherapy: Baby Oil And Nbuvb: The patient received Photochemotherapy: Baby Oil and NBUVB (baby oil applied to all lesions prior to phototherapy).
Treatment Number: 34
Protocol For Puva: The patient received PUVA.
Skin Type: I
Protocol For Nb Uva: The patient received NB UVA.
Protocol For Photochemotherapy: Petrolatum And Broad Band Uvb: The patient received Photochemotherapy: Petrolatum and Broad Band UVB.
Protocol For Photochemotherapy: Mineral Oil And Nbuvb: The patient received Photochemotherapy: Mineral Oil and NBUVB (mineral oil applied to all lesions prior to phototherapy).
Protocol For Broad Band Uvb: The patient received Broad Band UVB.
Protocol For Protocol For Photochemotherapy For Severe Photoresponsive Dermatoses: Bath Puva: The patient received Photochemotherapy for severe photoresponsive dermatoses: Bath PUVA requiring at least 4 to 8 hours of care under direct physician supervision.
Total Treatment Time: 1.20
Protocol For Photochemotherapy: Petrolatum And Nbuvb: The patient received Photochemotherapy: Petrolatum and NBUVB (petrolatum applied to all lesions prior to phototherapy).
Protocol For Photochemotherapy For Severe Photoresponsive Dermatoses: Petrolatum And Nbuvb: The patient received Photochemotherapy for severe photoresponsive dermatoses: Petrolatum and NBUVB requiring at least 4 to 8 hours of care under direct physician supervision.
Post-Care Instructions: I reviewed with the patient in detail post-care instructions. Patient is to wear sun protection. Patients may expect sunburn like redness, discomfort and scabbing.
Protocol For Photochemotherapy For Severe Photoresponsive Dermatoses: Petrolatum And Broad Band Uvb: The patient received Photochemotherapyfor severe photoresponsive dermatoses: Petrolatum and Broad Band UVB requiring at least 4 to 8 hours of care under direct physician supervision.
Protocol For Photochemotherapy: Tar And Broad Band Uvb (Goeckerman Treatment): The patient received Photochemotherapy: Tar and Broad Band UVB (Goeckerman treatment).
Consent: Written consent obtained.  The risks were reviewed with the patient including but not limited to: burn, pigmentary changes, pain, blistering, scabbing, redness, increased risk of skin cancers, and the remote possibility of scarring.
Protocol For Bath Puva: The patient received Bath PUVA.
Protocol For Uva1: The patient received UVA1.
Protocol For Photochemotherapy For Severe Photoresponsive Dermatoses: Tar And Broad Band Uvb (Goeckerman Treatment): The patient received Photochemotherapy for severe photoresponsive dermatoses: Tar and Broad Band UVB (Goeckerman treatment) requiring at least 4 to 8 hours of care under direct physician supervision.
Protocol For Photochemotherapy: Tar And Nbuvb (Goeckerman Treatment): The patient received Photochemotherapy: Tar and NBUVB (Goeckerman treatment).
Protocol: NBUVB
Protocol For Photochemotherapy For Severe Photoresponsive Dermatoses: Tar And Nbuvb (Goeckerman Treatment): The patient received Photochemotherapy for severe photoresponsive dermatoses: Tar and NBUVB (Goeckerman treatment) requiring at least 4 to 8 hours of care under direct physician supervision.
Detail Level: Generalized

## 2022-10-20 ENCOUNTER — APPOINTMENT (OUTPATIENT)
Dept: URBAN - NONMETROPOLITAN AREA SURGERY 8 | Age: 87
Setting detail: DERMATOLOGY
End: 2022-10-20

## 2022-10-20 DIAGNOSIS — L40.0 PSORIASIS VULGARIS: ICD-10-CM

## 2022-10-20 PROCEDURE — OTHER COUNSELING: OTHER

## 2022-10-20 PROCEDURE — 96900 ACTINOTHERAPY UV LIGHT: CPT

## 2022-10-20 PROCEDURE — OTHER PHOTOTHERAPY TREATMENT: OTHER

## 2022-10-20 ASSESSMENT — LOCATION ZONE DERM: LOCATION ZONE: TRUNK

## 2022-10-20 ASSESSMENT — LOCATION DETAILED DESCRIPTION DERM: LOCATION DETAILED: LEFT CLAVICULAR SKIN

## 2022-10-20 ASSESSMENT — LOCATION SIMPLE DESCRIPTION DERM: LOCATION SIMPLE: LEFT CLAVICULAR SKIN

## 2022-10-20 NOTE — HPI: PHOTOTHERAPY
Is This A New Presentation, Or A Follow-Up?: Phototherapy Treatment
When Was Your Last Phototherapy Treatment?: 10/18/2022

## 2022-10-20 NOTE — PROCEDURE: PHOTOTHERAPY TREATMENT
Detail Level: Generalized
Protocol For Photochemotherapy: Mineral Oil And Nbuvb: The patient received Photochemotherapy: Mineral Oil and NBUVB (mineral oil applied to all lesions prior to phototherapy).
Protocol For Photochemotherapy For Severe Photoresponsive Dermatoses: Puva: The patient received Photochemotherapy for severe photoresponsive dermatoses: PUVA requiring at least 4 to 8 hours of care under direct physician supervision.
Protocol For Uva: The patient received UVA.
Render Post-Care In The Note: no
Protocol For Photochemotherapy: Petrolatum And Broad Band Uvb: The patient received Photochemotherapy: Petrolatum and Broad Band UVB.
Protocol For Photochemotherapy: Mineral Oil And Broad Band Uvb: The patient received Photochemotherapy: Mineral Oil and Broad Band UVB.
Protocol For Nbuvb: Hands/Feet: The patient received NBUVB.
Protocol For Uva1: The patient received UVA1.
Protocol For Photochemotherapy: Tar And Nbuvb (Goeckerman Treatment): The patient received Photochemotherapy: Tar and NBUVB (Goeckerman treatment).
Protocol For Photochemotherapy: Triamcinolone Ointment And Nbuvb: The patient received Photochemotherapy: Triamcinolone and NBUVB (triamcinolone ointment applied to all lesions prior to phototherapy).
Protocol For Bath Puva: The patient received Bath PUVA.
Protocol For Photochemotherapy For Severe Photoresponsive Dermatoses: Tar And Broad Band Uvb (Goeckerman Treatment): The patient received Photochemotherapy for severe photoresponsive dermatoses: Tar and Broad Band UVB (Goeckerman treatment) requiring at least 4 to 8 hours of care under direct physician supervision.
Skin Type: I
Protocol: NBUVB
Protocol For Photochemotherapy For Severe Photoresponsive Dermatoses: Tar And Nbuvb (Goeckerman Treatment): The patient received Photochemotherapy for severe photoresponsive dermatoses: Tar and NBUVB (Goeckerman treatment) requiring at least 4 to 8 hours of care under direct physician supervision.
Post-Care Instructions: I reviewed with the patient in detail post-care instructions. Patient is to wear sun protection. Patients may expect sunburn like redness, discomfort and scabbing.
Protocol For Photochemotherapy: Petrolatum And Nbuvb: The patient received Photochemotherapy: Petrolatum and NBUVB (petrolatum applied to all lesions prior to phototherapy).
Protocol For Protocol For Photochemotherapy For Severe Photoresponsive Dermatoses: Bath Puva: The patient received Photochemotherapy for severe photoresponsive dermatoses: Bath PUVA requiring at least 4 to 8 hours of care under direct physician supervision.
Protocol For Broad Band Uvb: The patient received Broad Band UVB.
Protocol For Photochemotherapy For Severe Photoresponsive Dermatoses: Petrolatum And Nbuvb: The patient received Photochemotherapy for severe photoresponsive dermatoses: Petrolatum and NBUVB requiring at least 4 to 8 hours of care under direct physician supervision.
Total Treatment Time: 1.22
Protocol For Photochemotherapy For Severe Photoresponsive Dermatoses: Petrolatum And Broad Band Uvb: The patient received Photochemotherapyfor severe photoresponsive dermatoses: Petrolatum and Broad Band UVB requiring at least 4 to 8 hours of care under direct physician supervision.
Protocol For Photochemotherapy: Tar And Broad Band Uvb (Goeckerman Treatment): The patient received Photochemotherapy: Tar and Broad Band UVB (Goeckerman treatment).
Protocol For Puva: The patient received PUVA.
Protocol For Photochemotherapy: Baby Oil And Nbuvb: The patient received Photochemotherapy: Baby Oil and NBUVB (baby oil applied to all lesions prior to phototherapy).
Consent: Written consent obtained.  The risks were reviewed with the patient including but not limited to: burn, pigmentary changes, pain, blistering, scabbing, redness, increased risk of skin cancers, and the remote possibility of scarring.
Treatment Number: 35
Protocol For Nb Uva: The patient received NB UVA.